# Patient Record
Sex: FEMALE | Race: BLACK OR AFRICAN AMERICAN | Employment: FULL TIME | URBAN - METROPOLITAN AREA
[De-identification: names, ages, dates, MRNs, and addresses within clinical notes are randomized per-mention and may not be internally consistent; named-entity substitution may affect disease eponyms.]

---

## 2020-03-16 ENCOUNTER — HOSPITAL ENCOUNTER (INPATIENT)
Age: 35
LOS: 1 days | Discharge: HOME | DRG: 263 | End: 2020-03-18
Attending: EMERGENCY MEDICINE | Admitting: SURGERY
Payer: COMMERCIAL

## 2020-03-16 ENCOUNTER — APPOINTMENT (OUTPATIENT)
Dept: GENERAL RADIOLOGY | Age: 35
DRG: 263 | End: 2020-03-16
Payer: COMMERCIAL

## 2020-03-16 LAB
ALBUMIN SERPL-MCNC: 3.7 G/DL (ref 3.4–5)
ALP BLD-CCNC: 47 U/L (ref 40–129)
ALT SERPL-CCNC: 13 U/L (ref 10–40)
ANION GAP SERPL CALCULATED.3IONS-SCNC: 13 MMOL/L (ref 3–16)
AST SERPL-CCNC: 17 U/L (ref 15–37)
BASOPHILS ABSOLUTE: 0.1 K/UL (ref 0–0.2)
BASOPHILS RELATIVE PERCENT: 0.9 %
BILIRUB SERPL-MCNC: <0.2 MG/DL (ref 0–1)
BILIRUBIN DIRECT: <0.2 MG/DL (ref 0–0.3)
BILIRUBIN, INDIRECT: NORMAL MG/DL (ref 0–1)
BUN BLDV-MCNC: 10 MG/DL (ref 7–20)
CALCIUM SERPL-MCNC: 9.5 MG/DL (ref 8.3–10.6)
CHLORIDE BLD-SCNC: 103 MMOL/L (ref 99–110)
CO2: 22 MMOL/L (ref 21–32)
CREAT SERPL-MCNC: 0.8 MG/DL (ref 0.6–1.1)
EOSINOPHILS ABSOLUTE: 0.1 K/UL (ref 0–0.6)
EOSINOPHILS RELATIVE PERCENT: 0.9 %
GFR AFRICAN AMERICAN: >60
GFR NON-AFRICAN AMERICAN: >60
GLUCOSE BLD-MCNC: 133 MG/DL (ref 70–99)
HCT VFR BLD CALC: 35.4 % (ref 36–48)
HEMOGLOBIN: 11.6 G/DL (ref 12–16)
LIPASE: 30 U/L (ref 13–60)
LYMPHOCYTES ABSOLUTE: 3.8 K/UL (ref 1–5.1)
LYMPHOCYTES RELATIVE PERCENT: 41.1 %
MAGNESIUM: 1.7 MG/DL (ref 1.8–2.4)
MCH RBC QN AUTO: 26.1 PG (ref 26–34)
MCHC RBC AUTO-ENTMCNC: 32.7 G/DL (ref 31–36)
MCV RBC AUTO: 79.9 FL (ref 80–100)
MONOCYTES ABSOLUTE: 0.8 K/UL (ref 0–1.3)
MONOCYTES RELATIVE PERCENT: 8.5 %
NEUTROPHILS ABSOLUTE: 4.5 K/UL (ref 1.7–7.7)
NEUTROPHILS RELATIVE PERCENT: 48.6 %
PDW BLD-RTO: 15.5 % (ref 12.4–15.4)
PHOSPHORUS: 2.3 MG/DL (ref 2.5–4.9)
PLATELET # BLD: 263 K/UL (ref 135–450)
PMV BLD AUTO: 8.7 FL (ref 5–10.5)
POTASSIUM SERPL-SCNC: 3.6 MMOL/L (ref 3.5–5.1)
RBC # BLD: 4.43 M/UL (ref 4–5.2)
SODIUM BLD-SCNC: 138 MMOL/L (ref 136–145)
TOTAL PROTEIN: 7.3 G/DL (ref 6.4–8.2)
TROPONIN: <0.01 NG/ML
WBC # BLD: 9.3 K/UL (ref 4–11)

## 2020-03-16 PROCEDURE — 80076 HEPATIC FUNCTION PANEL: CPT

## 2020-03-16 PROCEDURE — 71046 X-RAY EXAM CHEST 2 VIEWS: CPT

## 2020-03-16 PROCEDURE — 83735 ASSAY OF MAGNESIUM: CPT

## 2020-03-16 PROCEDURE — 99285 EMERGENCY DEPT VISIT HI MDM: CPT

## 2020-03-16 PROCEDURE — 80048 BASIC METABOLIC PNL TOTAL CA: CPT

## 2020-03-16 PROCEDURE — 93005 ELECTROCARDIOGRAM TRACING: CPT | Performed by: EMERGENCY MEDICINE

## 2020-03-16 PROCEDURE — 84100 ASSAY OF PHOSPHORUS: CPT

## 2020-03-16 PROCEDURE — 96374 THER/PROPH/DIAG INJ IV PUSH: CPT

## 2020-03-16 PROCEDURE — 83690 ASSAY OF LIPASE: CPT

## 2020-03-16 PROCEDURE — 84484 ASSAY OF TROPONIN QUANT: CPT

## 2020-03-16 PROCEDURE — 85025 COMPLETE CBC W/AUTO DIFF WBC: CPT

## 2020-03-16 PROCEDURE — 6360000002 HC RX W HCPCS: Performed by: EMERGENCY MEDICINE

## 2020-03-16 RX ORDER — LISINOPRIL 20 MG/1
20 TABLET ORAL DAILY
COMMUNITY
Start: 2020-03-16

## 2020-03-16 RX ORDER — HYDROCHLOROTHIAZIDE 25 MG/1
25 TABLET ORAL DAILY
COMMUNITY
Start: 2020-03-16

## 2020-03-16 RX ADMIN — HYDROMORPHONE HYDROCHLORIDE 1 MG: 1 INJECTION, SOLUTION INTRAMUSCULAR; INTRAVENOUS; SUBCUTANEOUS at 22:21

## 2020-03-16 ASSESSMENT — PAIN SCALES - GENERAL
PAINLEVEL_OUTOF10: 10
PAINLEVEL_OUTOF10: 5

## 2020-03-17 ENCOUNTER — ANESTHESIA (OUTPATIENT)
Dept: OPERATING ROOM | Age: 35
DRG: 263 | End: 2020-03-17
Payer: COMMERCIAL

## 2020-03-17 ENCOUNTER — APPOINTMENT (OUTPATIENT)
Dept: CT IMAGING | Age: 35
DRG: 263 | End: 2020-03-17
Payer: COMMERCIAL

## 2020-03-17 ENCOUNTER — ANESTHESIA EVENT (OUTPATIENT)
Dept: OPERATING ROOM | Age: 35
DRG: 263 | End: 2020-03-17
Payer: COMMERCIAL

## 2020-03-17 VITALS — TEMPERATURE: 97.3 F | OXYGEN SATURATION: 100 % | DIASTOLIC BLOOD PRESSURE: 104 MMHG | SYSTOLIC BLOOD PRESSURE: 177 MMHG

## 2020-03-17 PROBLEM — K81.0 ACUTE CHOLECYSTITIS: Status: ACTIVE | Noted: 2020-03-17

## 2020-03-17 PROBLEM — K80.12 CHOLELITHIASIS WITH ACUTE ON CHRONIC CHOLECYSTITIS: Status: ACTIVE | Noted: 2020-03-17

## 2020-03-17 LAB
ALBUMIN SERPL-MCNC: 3.5 G/DL (ref 3.4–5)
ALP BLD-CCNC: 49 U/L (ref 40–129)
ALT SERPL-CCNC: 12 U/L (ref 10–40)
ANION GAP SERPL CALCULATED.3IONS-SCNC: 11 MMOL/L (ref 3–16)
AST SERPL-CCNC: 16 U/L (ref 15–37)
BASOPHILS ABSOLUTE: 0 K/UL (ref 0–0.2)
BASOPHILS RELATIVE PERCENT: 0.2 %
BILIRUB SERPL-MCNC: <0.2 MG/DL (ref 0–1)
BILIRUBIN DIRECT: <0.2 MG/DL (ref 0–0.3)
BILIRUBIN URINE: NEGATIVE
BILIRUBIN, INDIRECT: NORMAL MG/DL (ref 0–1)
BLOOD, URINE: NEGATIVE
BUN BLDV-MCNC: 9 MG/DL (ref 7–20)
CALCIUM SERPL-MCNC: 8.9 MG/DL (ref 8.3–10.6)
CHLORIDE BLD-SCNC: 103 MMOL/L (ref 99–110)
CLARITY: CLEAR
CO2: 22 MMOL/L (ref 21–32)
COLOR: YELLOW
CREAT SERPL-MCNC: 0.6 MG/DL (ref 0.6–1.1)
EKG ATRIAL RATE: 86 BPM
EKG DIAGNOSIS: NORMAL
EKG P AXIS: 56 DEGREES
EKG P-R INTERVAL: 176 MS
EKG Q-T INTERVAL: 392 MS
EKG QRS DURATION: 84 MS
EKG QTC CALCULATION (BAZETT): 469 MS
EKG R AXIS: 46 DEGREES
EKG T AXIS: 59 DEGREES
EKG VENTRICULAR RATE: 86 BPM
EOSINOPHILS ABSOLUTE: 0 K/UL (ref 0–0.6)
EOSINOPHILS RELATIVE PERCENT: 0.1 %
GFR AFRICAN AMERICAN: >60
GFR NON-AFRICAN AMERICAN: >60
GLUCOSE BLD-MCNC: 136 MG/DL (ref 70–99)
GLUCOSE URINE: NEGATIVE MG/DL
HCG QUALITATIVE: NEGATIVE
HCT VFR BLD CALC: 35.3 % (ref 36–48)
HEMOGLOBIN: 11.6 G/DL (ref 12–16)
KETONES, URINE: NEGATIVE MG/DL
LEUKOCYTE ESTERASE, URINE: NEGATIVE
LYMPHOCYTES ABSOLUTE: 1.4 K/UL (ref 1–5.1)
LYMPHOCYTES RELATIVE PERCENT: 15.3 %
MAGNESIUM: 1.8 MG/DL (ref 1.8–2.4)
MCH RBC QN AUTO: 26.5 PG (ref 26–34)
MCHC RBC AUTO-ENTMCNC: 32.9 G/DL (ref 31–36)
MCV RBC AUTO: 80.5 FL (ref 80–100)
MICROSCOPIC EXAMINATION: NORMAL
MONOCYTES ABSOLUTE: 0.6 K/UL (ref 0–1.3)
MONOCYTES RELATIVE PERCENT: 6.4 %
NEUTROPHILS ABSOLUTE: 7.4 K/UL (ref 1.7–7.7)
NEUTROPHILS RELATIVE PERCENT: 78 %
NITRITE, URINE: NEGATIVE
PDW BLD-RTO: 16.1 % (ref 12.4–15.4)
PH UA: 6 (ref 5–8)
PHOSPHORUS: 2.9 MG/DL (ref 2.5–4.9)
PLATELET # BLD: 246 K/UL (ref 135–450)
PMV BLD AUTO: 8.8 FL (ref 5–10.5)
POTASSIUM SERPL-SCNC: 3.8 MMOL/L (ref 3.5–5.1)
PROTEIN UA: NEGATIVE MG/DL
RBC # BLD: 4.38 M/UL (ref 4–5.2)
SODIUM BLD-SCNC: 136 MMOL/L (ref 136–145)
SPECIFIC GRAVITY UA: 1.02 (ref 1–1.03)
TOTAL PROTEIN: 6.8 G/DL (ref 6.4–8.2)
URINE REFLEX TO CULTURE: NORMAL
URINE TYPE: NORMAL
UROBILINOGEN, URINE: 0.2 E.U./DL
WBC # BLD: 9.5 K/UL (ref 4–11)

## 2020-03-17 PROCEDURE — 6360000002 HC RX W HCPCS: Performed by: STUDENT IN AN ORGANIZED HEALTH CARE EDUCATION/TRAINING PROGRAM

## 2020-03-17 PROCEDURE — 6360000002 HC RX W HCPCS: Performed by: ANESTHESIOLOGY

## 2020-03-17 PROCEDURE — 96376 TX/PRO/DX INJ SAME DRUG ADON: CPT

## 2020-03-17 PROCEDURE — 0WQF4ZZ REPAIR ABDOMINAL WALL, PERCUTANEOUS ENDOSCOPIC APPROACH: ICD-10-PCS | Performed by: SURGERY

## 2020-03-17 PROCEDURE — 49653 PR LAP, VENTRAL HERNIA REPAIR,INCARCERATED: CPT | Performed by: SURGERY

## 2020-03-17 PROCEDURE — 47562 LAPAROSCOPIC CHOLECYSTECTOMY: CPT | Performed by: SURGERY

## 2020-03-17 PROCEDURE — 6360000002 HC RX W HCPCS: Performed by: NURSE ANESTHETIST, CERTIFIED REGISTERED

## 2020-03-17 PROCEDURE — 99220 PR INITIAL OBSERVATION CARE/DAY 70 MINUTES: CPT | Performed by: SURGERY

## 2020-03-17 PROCEDURE — 6360000004 HC RX CONTRAST MEDICATION: Performed by: EMERGENCY MEDICINE

## 2020-03-17 PROCEDURE — 2500000003 HC RX 250 WO HCPCS: Performed by: STUDENT IN AN ORGANIZED HEALTH CARE EDUCATION/TRAINING PROGRAM

## 2020-03-17 PROCEDURE — 6370000000 HC RX 637 (ALT 250 FOR IP): Performed by: EMERGENCY MEDICINE

## 2020-03-17 PROCEDURE — 88304 TISSUE EXAM BY PATHOLOGIST: CPT

## 2020-03-17 PROCEDURE — 2580000003 HC RX 258: Performed by: STUDENT IN AN ORGANIZED HEALTH CARE EDUCATION/TRAINING PROGRAM

## 2020-03-17 PROCEDURE — G0378 HOSPITAL OBSERVATION PER HR: HCPCS

## 2020-03-17 PROCEDURE — 83735 ASSAY OF MAGNESIUM: CPT

## 2020-03-17 PROCEDURE — 7100000000 HC PACU RECOVERY - FIRST 15 MIN: Performed by: SURGERY

## 2020-03-17 PROCEDURE — 74177 CT ABD & PELVIS W/CONTRAST: CPT

## 2020-03-17 PROCEDURE — 3700000001 HC ADD 15 MINUTES (ANESTHESIA): Performed by: SURGERY

## 2020-03-17 PROCEDURE — 3700000000 HC ANESTHESIA ATTENDED CARE: Performed by: SURGERY

## 2020-03-17 PROCEDURE — 2500000003 HC RX 250 WO HCPCS: Performed by: NURSE ANESTHETIST, CERTIFIED REGISTERED

## 2020-03-17 PROCEDURE — 80069 RENAL FUNCTION PANEL: CPT

## 2020-03-17 PROCEDURE — 2500000003 HC RX 250 WO HCPCS: Performed by: ANESTHESIOLOGY

## 2020-03-17 PROCEDURE — 2709999900 HC NON-CHARGEABLE SUPPLY: Performed by: SURGERY

## 2020-03-17 PROCEDURE — 85025 COMPLETE CBC W/AUTO DIFF WBC: CPT

## 2020-03-17 PROCEDURE — 36415 COLL VENOUS BLD VENIPUNCTURE: CPT

## 2020-03-17 PROCEDURE — 3600000014 HC SURGERY LEVEL 4 ADDTL 15MIN: Performed by: SURGERY

## 2020-03-17 PROCEDURE — 6370000000 HC RX 637 (ALT 250 FOR IP): Performed by: STUDENT IN AN ORGANIZED HEALTH CARE EDUCATION/TRAINING PROGRAM

## 2020-03-17 PROCEDURE — 2580000003 HC RX 258: Performed by: SURGERY

## 2020-03-17 PROCEDURE — 84703 CHORIONIC GONADOTROPIN ASSAY: CPT

## 2020-03-17 PROCEDURE — 0FT44ZZ RESECTION OF GALLBLADDER, PERCUTANEOUS ENDOSCOPIC APPROACH: ICD-10-PCS | Performed by: SURGERY

## 2020-03-17 PROCEDURE — 2720000010 HC SURG SUPPLY STERILE: Performed by: SURGERY

## 2020-03-17 PROCEDURE — 81003 URINALYSIS AUTO W/O SCOPE: CPT

## 2020-03-17 PROCEDURE — 2500000003 HC RX 250 WO HCPCS: Performed by: SURGERY

## 2020-03-17 PROCEDURE — 1200000000 HC SEMI PRIVATE

## 2020-03-17 PROCEDURE — 80076 HEPATIC FUNCTION PANEL: CPT

## 2020-03-17 PROCEDURE — 3600000004 HC SURGERY LEVEL 4 BASE: Performed by: SURGERY

## 2020-03-17 PROCEDURE — 7100000001 HC PACU RECOVERY - ADDTL 15 MIN: Performed by: SURGERY

## 2020-03-17 PROCEDURE — 6360000002 HC RX W HCPCS: Performed by: EMERGENCY MEDICINE

## 2020-03-17 RX ORDER — ACETAMINOPHEN 500 MG
1000 TABLET ORAL EVERY 6 HOURS
Status: DISCONTINUED | OUTPATIENT
Start: 2020-03-17 | End: 2020-03-18 | Stop reason: HOSPADM

## 2020-03-17 RX ORDER — NEOSTIGMINE METHYLSULFATE 5 MG/5 ML
SYRINGE (ML) INTRAVENOUS PRN
Status: DISCONTINUED | OUTPATIENT
Start: 2020-03-17 | End: 2020-03-17 | Stop reason: SDUPTHER

## 2020-03-17 RX ORDER — HYDRALAZINE HYDROCHLORIDE 20 MG/ML
10 INJECTION INTRAMUSCULAR; INTRAVENOUS EVERY 6 HOURS PRN
Status: DISCONTINUED | OUTPATIENT
Start: 2020-03-17 | End: 2020-03-18 | Stop reason: HOSPADM

## 2020-03-17 RX ORDER — DEXAMETHASONE SODIUM PHOSPHATE 4 MG/ML
INJECTION, SOLUTION INTRA-ARTICULAR; INTRALESIONAL; INTRAMUSCULAR; INTRAVENOUS; SOFT TISSUE PRN
Status: DISCONTINUED | OUTPATIENT
Start: 2020-03-17 | End: 2020-03-17 | Stop reason: SDUPTHER

## 2020-03-17 RX ORDER — PHENYLEPHRINE HYDROCHLORIDE 10 MG/ML
INJECTION INTRAVENOUS PRN
Status: DISCONTINUED | OUTPATIENT
Start: 2020-03-17 | End: 2020-03-17 | Stop reason: SDUPTHER

## 2020-03-17 RX ORDER — MAGNESIUM SULFATE IN WATER 40 MG/ML
2 INJECTION, SOLUTION INTRAVENOUS ONCE
Status: COMPLETED | OUTPATIENT
Start: 2020-03-17 | End: 2020-03-17

## 2020-03-17 RX ORDER — SODIUM CHLORIDE, SODIUM LACTATE, POTASSIUM CHLORIDE, CALCIUM CHLORIDE 600; 310; 30; 20 MG/100ML; MG/100ML; MG/100ML; MG/100ML
INJECTION, SOLUTION INTRAVENOUS CONTINUOUS
Status: DISCONTINUED | OUTPATIENT
Start: 2020-03-17 | End: 2020-03-18

## 2020-03-17 RX ORDER — HYDROMORPHONE HCL 110MG/55ML
PATIENT CONTROLLED ANALGESIA SYRINGE INTRAVENOUS PRN
Status: DISCONTINUED | OUTPATIENT
Start: 2020-03-17 | End: 2020-03-17 | Stop reason: SDUPTHER

## 2020-03-17 RX ORDER — FENTANYL CITRATE 50 UG/ML
INJECTION, SOLUTION INTRAMUSCULAR; INTRAVENOUS PRN
Status: DISCONTINUED | OUTPATIENT
Start: 2020-03-17 | End: 2020-03-17 | Stop reason: SDUPTHER

## 2020-03-17 RX ORDER — MAGNESIUM HYDROXIDE 1200 MG/15ML
LIQUID ORAL CONTINUOUS PRN
Status: COMPLETED | OUTPATIENT
Start: 2020-03-17 | End: 2020-03-17

## 2020-03-17 RX ORDER — OXYCODONE HYDROCHLORIDE 5 MG/1
10 TABLET ORAL EVERY 4 HOURS PRN
Status: DISCONTINUED | OUTPATIENT
Start: 2020-03-17 | End: 2020-03-18 | Stop reason: HOSPADM

## 2020-03-17 RX ORDER — LABETALOL 20 MG/4 ML (5 MG/ML) INTRAVENOUS SYRINGE
5 EVERY 10 MIN PRN
Status: DISCONTINUED | OUTPATIENT
Start: 2020-03-17 | End: 2020-03-17 | Stop reason: HOSPADM

## 2020-03-17 RX ORDER — OXYCODONE HYDROCHLORIDE 5 MG/1
5 TABLET ORAL EVERY 4 HOURS PRN
Status: DISCONTINUED | OUTPATIENT
Start: 2020-03-17 | End: 2020-03-18 | Stop reason: HOSPADM

## 2020-03-17 RX ORDER — SODIUM CHLORIDE 0.9 % (FLUSH) 0.9 %
10 SYRINGE (ML) INJECTION EVERY 12 HOURS SCHEDULED
Status: DISCONTINUED | OUTPATIENT
Start: 2020-03-17 | End: 2020-03-18 | Stop reason: HOSPADM

## 2020-03-17 RX ORDER — HYDRALAZINE HYDROCHLORIDE 20 MG/ML
5 INJECTION INTRAMUSCULAR; INTRAVENOUS EVERY 10 MIN PRN
Status: DISCONTINUED | OUTPATIENT
Start: 2020-03-17 | End: 2020-03-17 | Stop reason: HOSPADM

## 2020-03-17 RX ORDER — OXYCODONE HYDROCHLORIDE AND ACETAMINOPHEN 5; 325 MG/1; MG/1
1 TABLET ORAL
Status: DISCONTINUED | OUTPATIENT
Start: 2020-03-17 | End: 2020-03-17 | Stop reason: HOSPADM

## 2020-03-17 RX ORDER — SODIUM CHLORIDE 0.9 % (FLUSH) 0.9 %
10 SYRINGE (ML) INJECTION PRN
Status: DISCONTINUED | OUTPATIENT
Start: 2020-03-17 | End: 2020-03-18 | Stop reason: HOSPADM

## 2020-03-17 RX ORDER — POTASSIUM CHLORIDE 7.45 MG/ML
10 INJECTION INTRAVENOUS
Status: COMPLETED | OUTPATIENT
Start: 2020-03-17 | End: 2020-03-17

## 2020-03-17 RX ORDER — ONDANSETRON 2 MG/ML
4 INJECTION INTRAMUSCULAR; INTRAVENOUS EVERY 6 HOURS PRN
Status: DISCONTINUED | OUTPATIENT
Start: 2020-03-17 | End: 2020-03-18 | Stop reason: HOSPADM

## 2020-03-17 RX ORDER — MORPHINE SULFATE 2 MG/ML
2 INJECTION, SOLUTION INTRAMUSCULAR; INTRAVENOUS
Status: DISCONTINUED | OUTPATIENT
Start: 2020-03-17 | End: 2020-03-18

## 2020-03-17 RX ORDER — SODIUM CHLORIDE, SODIUM LACTATE, POTASSIUM CHLORIDE, CALCIUM CHLORIDE 600; 310; 30; 20 MG/100ML; MG/100ML; MG/100ML; MG/100ML
INJECTION, SOLUTION INTRAVENOUS CONTINUOUS PRN
Status: COMPLETED | OUTPATIENT
Start: 2020-03-17 | End: 2020-03-17

## 2020-03-17 RX ORDER — ENALAPRILAT 2.5 MG/2ML
1.25 INJECTION INTRAVENOUS ONCE
Status: COMPLETED | OUTPATIENT
Start: 2020-03-17 | End: 2020-03-17

## 2020-03-17 RX ORDER — PROMETHAZINE HYDROCHLORIDE 25 MG/ML
6.25 INJECTION, SOLUTION INTRAMUSCULAR; INTRAVENOUS
Status: DISCONTINUED | OUTPATIENT
Start: 2020-03-17 | End: 2020-03-17 | Stop reason: HOSPADM

## 2020-03-17 RX ORDER — MIDAZOLAM HYDROCHLORIDE 1 MG/ML
INJECTION INTRAMUSCULAR; INTRAVENOUS PRN
Status: DISCONTINUED | OUTPATIENT
Start: 2020-03-17 | End: 2020-03-17 | Stop reason: SDUPTHER

## 2020-03-17 RX ORDER — FENTANYL CITRATE 50 UG/ML
50 INJECTION, SOLUTION INTRAMUSCULAR; INTRAVENOUS EVERY 5 MIN PRN
Status: DISCONTINUED | OUTPATIENT
Start: 2020-03-17 | End: 2020-03-17 | Stop reason: HOSPADM

## 2020-03-17 RX ORDER — FENTANYL CITRATE 50 UG/ML
25 INJECTION, SOLUTION INTRAMUSCULAR; INTRAVENOUS EVERY 5 MIN PRN
Status: DISCONTINUED | OUTPATIENT
Start: 2020-03-17 | End: 2020-03-17 | Stop reason: HOSPADM

## 2020-03-17 RX ORDER — PROPOFOL 10 MG/ML
INJECTION, EMULSION INTRAVENOUS PRN
Status: DISCONTINUED | OUTPATIENT
Start: 2020-03-17 | End: 2020-03-17 | Stop reason: SDUPTHER

## 2020-03-17 RX ORDER — BUPIVACAINE HYDROCHLORIDE 5 MG/ML
INJECTION, SOLUTION EPIDURAL; INTRACAUDAL PRN
Status: DISCONTINUED | OUTPATIENT
Start: 2020-03-17 | End: 2020-03-17 | Stop reason: ALTCHOICE

## 2020-03-17 RX ORDER — MORPHINE SULFATE 4 MG/ML
4 INJECTION, SOLUTION INTRAMUSCULAR; INTRAVENOUS
Status: DISCONTINUED | OUTPATIENT
Start: 2020-03-17 | End: 2020-03-18

## 2020-03-17 RX ORDER — ROCURONIUM BROMIDE 10 MG/ML
INJECTION, SOLUTION INTRAVENOUS PRN
Status: DISCONTINUED | OUTPATIENT
Start: 2020-03-17 | End: 2020-03-17 | Stop reason: SDUPTHER

## 2020-03-17 RX ORDER — GLYCOPYRROLATE 1 MG/5 ML
SYRINGE (ML) INTRAVENOUS PRN
Status: DISCONTINUED | OUTPATIENT
Start: 2020-03-17 | End: 2020-03-17 | Stop reason: SDUPTHER

## 2020-03-17 RX ORDER — ONDANSETRON 2 MG/ML
INJECTION INTRAMUSCULAR; INTRAVENOUS PRN
Status: DISCONTINUED | OUTPATIENT
Start: 2020-03-17 | End: 2020-03-17 | Stop reason: SDUPTHER

## 2020-03-17 RX ORDER — ONDANSETRON 2 MG/ML
4 INJECTION INTRAMUSCULAR; INTRAVENOUS
Status: DISCONTINUED | OUTPATIENT
Start: 2020-03-17 | End: 2020-03-17 | Stop reason: HOSPADM

## 2020-03-17 RX ORDER — ENALAPRILAT 2.5 MG/2ML
1.25 INJECTION INTRAVENOUS EVERY 6 HOURS PRN
Status: DISCONTINUED | OUTPATIENT
Start: 2020-03-17 | End: 2020-03-18 | Stop reason: HOSPADM

## 2020-03-17 RX ORDER — LIDOCAINE HYDROCHLORIDE 20 MG/ML
INJECTION, SOLUTION INTRAVENOUS PRN
Status: DISCONTINUED | OUTPATIENT
Start: 2020-03-17 | End: 2020-03-17 | Stop reason: SDUPTHER

## 2020-03-17 RX ADMIN — MORPHINE SULFATE 4 MG: 4 INJECTION INTRAVENOUS at 03:46

## 2020-03-17 RX ADMIN — HYDRALAZINE HYDROCHLORIDE 5 MG: 20 INJECTION INTRAMUSCULAR; INTRAVENOUS at 16:57

## 2020-03-17 RX ADMIN — MIDAZOLAM HYDROCHLORIDE 2 MG: 2 INJECTION, SOLUTION INTRAMUSCULAR; INTRAVENOUS at 14:09

## 2020-03-17 RX ADMIN — PHENYLEPHRINE HYDROCHLORIDE 100 MCG: 10 INJECTION INTRAVENOUS at 15:04

## 2020-03-17 RX ADMIN — ENALAPRILAT 1.25 MG: 2.5 INJECTION INTRAVENOUS at 20:14

## 2020-03-17 RX ADMIN — PHENYLEPHRINE HYDROCHLORIDE 100 MCG: 10 INJECTION INTRAVENOUS at 15:01

## 2020-03-17 RX ADMIN — MORPHINE SULFATE 4 MG: 4 INJECTION INTRAVENOUS at 22:22

## 2020-03-17 RX ADMIN — HYDROMORPHONE HYDROCHLORIDE 1 MG: 1 INJECTION, SOLUTION INTRAMUSCULAR; INTRAVENOUS; SUBCUTANEOUS at 02:10

## 2020-03-17 RX ADMIN — Medication 0.4 MG: at 16:10

## 2020-03-17 RX ADMIN — LIDOCAINE HYDROCHLORIDE: 20 SOLUTION ORAL; TOPICAL at 01:37

## 2020-03-17 RX ADMIN — CEFAZOLIN SODIUM 2 G: 10 INJECTION, POWDER, FOR SOLUTION INTRAVENOUS at 14:20

## 2020-03-17 RX ADMIN — ROCURONIUM BROMIDE 50 MG: 10 INJECTION, SOLUTION INTRAVENOUS at 14:18

## 2020-03-17 RX ADMIN — ENALAPRILAT 1.25 MG: 2.5 INJECTION INTRAVENOUS at 03:46

## 2020-03-17 RX ADMIN — MORPHINE SULFATE 2 MG: 2 INJECTION, SOLUTION INTRAMUSCULAR; INTRAVENOUS at 08:28

## 2020-03-17 RX ADMIN — LIDOCAINE HYDROCHLORIDE 50 MG: 20 INJECTION, SOLUTION INTRAVENOUS at 14:18

## 2020-03-17 RX ADMIN — ONDANSETRON 4 MG: 2 INJECTION INTRAMUSCULAR; INTRAVENOUS at 16:03

## 2020-03-17 RX ADMIN — HYDROMORPHONE HYDROCHLORIDE 2 MG: 2 INJECTION, SOLUTION INTRAMUSCULAR; INTRAVENOUS; SUBCUTANEOUS at 14:42

## 2020-03-17 RX ADMIN — LABETALOL 20 MG/4 ML (5 MG/ML) INTRAVENOUS SYRINGE 5 MG: at 17:21

## 2020-03-17 RX ADMIN — IOPAMIDOL 80 ML: 755 INJECTION, SOLUTION INTRAVENOUS at 01:20

## 2020-03-17 RX ADMIN — HYDRALAZINE HYDROCHLORIDE 10 MG: 20 INJECTION INTRAMUSCULAR; INTRAVENOUS at 23:29

## 2020-03-17 RX ADMIN — FENTANYL CITRATE 100 MCG: 50 INJECTION INTRAMUSCULAR; INTRAVENOUS at 14:27

## 2020-03-17 RX ADMIN — Medication 10 ML: at 08:28

## 2020-03-17 RX ADMIN — HYDROMORPHONE HYDROCHLORIDE 0.25 MG: 1 INJECTION, SOLUTION INTRAMUSCULAR; INTRAVENOUS; SUBCUTANEOUS at 17:13

## 2020-03-17 RX ADMIN — ENOXAPARIN SODIUM 30 MG: 30 INJECTION SUBCUTANEOUS at 08:28

## 2020-03-17 RX ADMIN — MORPHINE SULFATE 2 MG: 2 INJECTION, SOLUTION INTRAMUSCULAR; INTRAVENOUS at 11:48

## 2020-03-17 RX ADMIN — OXYCODONE 10 MG: 5 TABLET ORAL at 20:22

## 2020-03-17 RX ADMIN — MORPHINE SULFATE 4 MG: 4 INJECTION INTRAVENOUS at 19:18

## 2020-03-17 RX ADMIN — Medication 10 ML: at 20:15

## 2020-03-17 RX ADMIN — HYDRALAZINE HYDROCHLORIDE 10 MG: 20 INJECTION INTRAMUSCULAR; INTRAVENOUS at 02:37

## 2020-03-17 RX ADMIN — ENALAPRILAT 1.25 MG: 2.5 INJECTION INTRAVENOUS at 03:17

## 2020-03-17 RX ADMIN — ENOXAPARIN SODIUM 30 MG: 30 INJECTION SUBCUTANEOUS at 20:14

## 2020-03-17 RX ADMIN — HYDROMORPHONE HYDROCHLORIDE 0.25 MG: 1 INJECTION, SOLUTION INTRAMUSCULAR; INTRAVENOUS; SUBCUTANEOUS at 17:01

## 2020-03-17 RX ADMIN — POTASSIUM CHLORIDE 10 MEQ: 7.46 INJECTION, SOLUTION INTRAVENOUS at 10:19

## 2020-03-17 RX ADMIN — HYDROMORPHONE HYDROCHLORIDE 0.25 MG: 1 INJECTION, SOLUTION INTRAMUSCULAR; INTRAVENOUS; SUBCUTANEOUS at 17:35

## 2020-03-17 RX ADMIN — HYDRALAZINE HYDROCHLORIDE 5 MG: 20 INJECTION INTRAMUSCULAR; INTRAVENOUS at 16:47

## 2020-03-17 RX ADMIN — ONDANSETRON 4 MG: 2 INJECTION INTRAMUSCULAR; INTRAVENOUS at 03:46

## 2020-03-17 RX ADMIN — Medication 3 MG: at 16:10

## 2020-03-17 RX ADMIN — ACETAMINOPHEN 1000 MG: 500 TABLET, COATED ORAL at 22:22

## 2020-03-17 RX ADMIN — LABETALOL 20 MG/4 ML (5 MG/ML) INTRAVENOUS SYRINGE 5 MG: at 17:33

## 2020-03-17 RX ADMIN — HYDROMORPHONE HYDROCHLORIDE 0.25 MG: 1 INJECTION, SOLUTION INTRAMUSCULAR; INTRAVENOUS; SUBCUTANEOUS at 16:41

## 2020-03-17 RX ADMIN — FENTANYL CITRATE 100 MCG: 50 INJECTION INTRAMUSCULAR; INTRAVENOUS at 14:18

## 2020-03-17 RX ADMIN — PROPOFOL 150 MG: 10 INJECTION, EMULSION INTRAVENOUS at 14:18

## 2020-03-17 RX ADMIN — DEXAMETHASONE SODIUM PHOSPHATE 8 MG: 4 INJECTION, SOLUTION INTRAMUSCULAR; INTRAVENOUS at 14:19

## 2020-03-17 RX ADMIN — SODIUM CHLORIDE, POTASSIUM CHLORIDE, SODIUM LACTATE AND CALCIUM CHLORIDE: 600; 310; 30; 20 INJECTION, SOLUTION INTRAVENOUS at 02:37

## 2020-03-17 RX ADMIN — POTASSIUM CHLORIDE 10 MEQ: 7.46 INJECTION, SOLUTION INTRAVENOUS at 11:43

## 2020-03-17 RX ADMIN — MAGNESIUM SULFATE HEPTAHYDRATE 2 G: 40 INJECTION, SOLUTION INTRAVENOUS at 09:59

## 2020-03-17 ASSESSMENT — PULMONARY FUNCTION TESTS
PIF_VALUE: 24
PIF_VALUE: 24
PIF_VALUE: 22
PIF_VALUE: 0
PIF_VALUE: 30
PIF_VALUE: 0
PIF_VALUE: 29
PIF_VALUE: 0
PIF_VALUE: 30
PIF_VALUE: 1
PIF_VALUE: 30
PIF_VALUE: 29
PIF_VALUE: 29
PIF_VALUE: 30
PIF_VALUE: 1
PIF_VALUE: 30
PIF_VALUE: 0
PIF_VALUE: 22
PIF_VALUE: 1
PIF_VALUE: 31
PIF_VALUE: 33
PIF_VALUE: 26
PIF_VALUE: 30
PIF_VALUE: 24
PIF_VALUE: 25
PIF_VALUE: 29
PIF_VALUE: 30
PIF_VALUE: 30
PIF_VALUE: 31
PIF_VALUE: 30
PIF_VALUE: 25
PIF_VALUE: 33
PIF_VALUE: 30
PIF_VALUE: 29
PIF_VALUE: 31
PIF_VALUE: 30
PIF_VALUE: 30
PIF_VALUE: 33
PIF_VALUE: 25
PIF_VALUE: 1
PIF_VALUE: 0
PIF_VALUE: 0
PIF_VALUE: 30
PIF_VALUE: 24
PIF_VALUE: 30
PIF_VALUE: 24
PIF_VALUE: 0
PIF_VALUE: 35
PIF_VALUE: 31
PIF_VALUE: 33
PIF_VALUE: 31
PIF_VALUE: 30
PIF_VALUE: 31
PIF_VALUE: 33
PIF_VALUE: 24
PIF_VALUE: 31
PIF_VALUE: 29
PIF_VALUE: 25
PIF_VALUE: 31
PIF_VALUE: 1
PIF_VALUE: 1
PIF_VALUE: 3
PIF_VALUE: 31
PIF_VALUE: 23
PIF_VALUE: 24
PIF_VALUE: 30
PIF_VALUE: 1
PIF_VALUE: 30
PIF_VALUE: 0
PIF_VALUE: 32
PIF_VALUE: 25
PIF_VALUE: 33
PIF_VALUE: 25
PIF_VALUE: 22
PIF_VALUE: 31
PIF_VALUE: 1
PIF_VALUE: 22
PIF_VALUE: 23
PIF_VALUE: 1
PIF_VALUE: 31
PIF_VALUE: 29
PIF_VALUE: 31
PIF_VALUE: 33
PIF_VALUE: 22
PIF_VALUE: 22
PIF_VALUE: 0
PIF_VALUE: 31
PIF_VALUE: 1
PIF_VALUE: 34
PIF_VALUE: 29
PIF_VALUE: 25
PIF_VALUE: 33
PIF_VALUE: 24
PIF_VALUE: 24
PIF_VALUE: 30
PIF_VALUE: 30
PIF_VALUE: 1
PIF_VALUE: 1
PIF_VALUE: 0
PIF_VALUE: 25
PIF_VALUE: 30
PIF_VALUE: 1
PIF_VALUE: 30
PIF_VALUE: 27
PIF_VALUE: 29
PIF_VALUE: 30
PIF_VALUE: 31
PIF_VALUE: 1
PIF_VALUE: 31
PIF_VALUE: 30
PIF_VALUE: 34
PIF_VALUE: 30
PIF_VALUE: 22
PIF_VALUE: 20
PIF_VALUE: 24
PIF_VALUE: 22
PIF_VALUE: 25
PIF_VALUE: 31
PIF_VALUE: 30
PIF_VALUE: 32
PIF_VALUE: 24
PIF_VALUE: 30
PIF_VALUE: 33
PIF_VALUE: 1
PIF_VALUE: 1
PIF_VALUE: 25
PIF_VALUE: 22
PIF_VALUE: 30
PIF_VALUE: 0
PIF_VALUE: 24
PIF_VALUE: 32
PIF_VALUE: 24
PIF_VALUE: 22
PIF_VALUE: 22
PIF_VALUE: 1
PIF_VALUE: 29
PIF_VALUE: 1
PIF_VALUE: 30
PIF_VALUE: 0
PIF_VALUE: 0

## 2020-03-17 ASSESSMENT — PAIN DESCRIPTION - PAIN TYPE
TYPE: SURGICAL PAIN
TYPE: ACUTE PAIN
TYPE: ACUTE PAIN
TYPE: SURGICAL PAIN;ACUTE PAIN
TYPE: SURGICAL PAIN
TYPE: ACUTE PAIN

## 2020-03-17 ASSESSMENT — PAIN DESCRIPTION - LOCATION
LOCATION: ABDOMEN

## 2020-03-17 ASSESSMENT — PAIN DESCRIPTION - ONSET
ONSET: ON-GOING

## 2020-03-17 ASSESSMENT — PAIN - FUNCTIONAL ASSESSMENT
PAIN_FUNCTIONAL_ASSESSMENT: PREVENTS OR INTERFERES SOME ACTIVE ACTIVITIES AND ADLS
PAIN_FUNCTIONAL_ASSESSMENT: PREVENTS OR INTERFERES SOME ACTIVE ACTIVITIES AND ADLS
PAIN_FUNCTIONAL_ASSESSMENT: ACTIVITIES ARE NOT PREVENTED

## 2020-03-17 ASSESSMENT — PAIN SCALES - GENERAL
PAINLEVEL_OUTOF10: 8
PAINLEVEL_OUTOF10: 6
PAINLEVEL_OUTOF10: 8
PAINLEVEL_OUTOF10: 7
PAINLEVEL_OUTOF10: 5
PAINLEVEL_OUTOF10: 6
PAINLEVEL_OUTOF10: 3
PAINLEVEL_OUTOF10: 6
PAINLEVEL_OUTOF10: 6
PAINLEVEL_OUTOF10: 10
PAINLEVEL_OUTOF10: 8
PAINLEVEL_OUTOF10: 8

## 2020-03-17 ASSESSMENT — PAIN DESCRIPTION - DESCRIPTORS
DESCRIPTORS: SHARP;STABBING
DESCRIPTORS: ACHING
DESCRIPTORS: ACHING
DESCRIPTORS: DISCOMFORT;SORE;SHARP
DESCRIPTORS: STABBING;THROBBING
DESCRIPTORS: STABBING;THROBBING

## 2020-03-17 ASSESSMENT — PAIN DESCRIPTION - PROGRESSION
CLINICAL_PROGRESSION: GRADUALLY WORSENING
CLINICAL_PROGRESSION: NOT CHANGED

## 2020-03-17 ASSESSMENT — PAIN DESCRIPTION - ORIENTATION
ORIENTATION: RIGHT;UPPER;INNER
ORIENTATION: MID
ORIENTATION: MID;UPPER
ORIENTATION: MID
ORIENTATION: RIGHT;UPPER;INNER
ORIENTATION: MID;UPPER;RIGHT

## 2020-03-17 ASSESSMENT — PAIN DESCRIPTION - FREQUENCY
FREQUENCY: CONTINUOUS
FREQUENCY: INTERMITTENT
FREQUENCY: INTERMITTENT
FREQUENCY: CONTINUOUS

## 2020-03-17 NOTE — PROGRESS NOTES
Awakened to see how pain is. She reports sore. I explained it would be sore, we can't get rid of all the pain. But we are aiming for tolerable. Appears to return to sleep.

## 2020-03-17 NOTE — DISCHARGE SUMMARY
Physician Discharge Summary     Patient ID:  Alonso Schaefer  [id number]  31 y.o.  1985    Admit date: 3/16/2020    Discharge date and time: 3/18/2020    Admitting Physician: Damion Graff DO     Discharge Physician: Damion Graff DO     Admission Diagnoses: Acute cholecystitis [K81.0]  Acute cholecystitis [K81.0]    Discharge Diagnoses: Acute cholecystitis [K81.0]  Acute cholecystitis [K81.0]    PMH:  has a past medical history of Hypertension. PSH:  has no past surgical history on file. Allergies: Patient has no known allergies. Admission Condition: fair    Discharged Condition: Stable    Indication for Admission: Acute cholecystitis [K81.0]  Acute cholecystitis [K81.0]    Hospital Course: This is a 28 y.o. female who presents with severe epigastric pain. Patient is afebrile, hypertensive. Labs WNL. CT scan obtained showing a large gallstone in the gallbladder, no wall thickening or pericholecystic fluid. Given persistent pain and evidence of cholelithiasis, will plan to take the patient to the OR later today for laparoscopic cholecystectomy. Patient underwent:   Post op:  POD 1 - patient doing well. Pain controlled. Ambulating hallways. Tolerating regular diet. Stable for discharge home. Patient discharged home in stable condition    Discharge Physical Exam:  General appearance: alert, appears stated age and cooperative  Lungs: clear to auscultation bilaterally  Heart: regular rate and rhythm, S1, S2   Abdomen: soft, appropriately-tender; bowel sounds present  Trocar sites well approx    Consults:   N/A    Significant Diagnostic Studies:   CT     Treatments/Procedures: surgery: Laparoscopic cholecystectomy    Disposition: home    Patient Instructions:   See discharge instruction form.     Signed:  Crispin Boast, MD  3/17/2020  11:44 AM  [phone]

## 2020-03-17 NOTE — OP NOTE
Laparoscopic Cholecystectomy/ Lap Ventral Hernia repair Procedure Note     Indications: This patient presents with symptomatic gallbladder disease and will undergo laparoscopic cholecystecomy. Pre-operative Diagnosis: Acute Cholecystitis     Post-operative Diagnosis: Acute on Chronic Cholecystitis, Incarcerated Ventral Hernia     Procedures:  1- Laparoscopic Cholecystectomy   2- Laparoscopic Incarcerated ventral hernia repair     Surgeon: Mayelin Mcintosh    Assistant: Dhaval Morris     Anesthesia: General endotracheal anesthesia     Procedure Details    The patient was seen again in the Holding Room. The risks, benefits, complications, treatment options, and expected outcomes were discussed with the patient. The possibilities of reaction to medication, pulmonary aspiration, perforation of viscus, bleeding, recurrent infection, finding a normal gallbladder, the need for additional procedures, failure to diagnose a condition, the possible need to convert to an open procedure, and creating a complication requiring transfusion or operation were discussed with the patient. The patient concurred with the proposed plan, giving informed consent. The site of surgery properly noted/marked. The patient was taken to Operating Room, properly identified and the procedure verified as Laparoscopic Cholecystectomy. A Time Out was held and the above information confirmed.     Prior to the induction of general anesthesia, antibiotic prophylaxis was administered. SCD's in place. General endotracheal anesthesia was then administered and tolerated well. After the induction, the abdomen was prepped in the usual sterile fashion.  The patient was positioned in the supine position with the arms comfortably positioned, along with some reverse trendelenberg.      Incision was made at Little's point in the left midclavicular line, Veress needle technique was used to achieve pneumoperitoneum which was then created with CO2 and tolerated well without any adverse changes in the patient's vital signs. Additional trocars were introduced under direct vision. All skin incisions were infiltrated with a local anesthetic agent before making the incision and placing the trocars.         On inspection the abdomen, there was no adverse events to placing the trocars. The gallbladder was identified, the fundus grasped and retracted cephalad. Adhesions were lysed bluntly and with the electrocautery where indicated, taking care not to injure any adjacent organs or viscus. The infundibulum was grasped and retracted laterally, exposing the peritoneum overlying the triangle of Calot. This was then divided and exposed in a blunt fashion. The cystic duct was clearly identified and bluntly dissected circumferentially. The junctions of the gallbladder, cystic duct and common bile duct were clearly identified prior to the division of any linear structure and Critical View of safety was obtained.      The 3 tenets of the critical view of safety that were achieved were as follows:     1.) The hepatocystic triangle was cleared of all fat and fibrous tissue  2.) The lower one-third of the gallbladder was  from the liver to expose the cystic plate  3.) Two and only two structures were seen to be going into the gallbladder      The cystic duct was then doubly ligated with surgical clips on the patient side and singly clipped on the gallbladder side and divided. The cystic artery was identified, dissected free, ligated with clips and divided as well.      The gallbladder was dissected from the liver bed in retrograde fashion with the electrocautery. The gallbladder was removed using the endo-catch bag. The liver bed was irrigated and inspected. Hemostasis was achieved with the electrocautery.  Copious irrigation was utilized and was repeatedly aspirated until clear.     Fascial closure device used to close the fascia at the 12 mm trocar.     Attention was then paid to a 1 cm ventral hernia around the umbilicus. This has a piece of pre-peritoneal fat incarcerated within in. This was reduced laparoscopically and sent for permanent pathologic examination and picture was taken Local anesthetic was then injected into the defect and a separate stab incision made into the hernia. A suture passer device with 0-vicryl suture was then used to repair the defect primarily. Multiple sutures were used to approximate the hernia defect without tension. Pneumoperitoneum was completely reduced after viewing removal of the trocars under direct vision. The wound was thoroughly irrigated the skin was then closed with 4.0 vicryl in subcuticular fashion, and dermabond was applied. Instrument, sponge, and needle counts were correct at closure and at the conclusion of the case.      Findings:  Cholecystitis with Cholelithiasis   Incarcerated ventral hernia      Estimated Blood Loss: Minimal      Drains: none      Total IV Fluids: 1000 ml      Specimens: Gallbladder, incarcerated hernia fat      Complications:  None; patient tolerated the procedure well.      Disposition: PACU - hemodynamically stable.      Condition: stable     Attending Attestation: I was present and scrubbed for the entire procedure.

## 2020-03-17 NOTE — ANESTHESIA PRE PROCEDURE
Department of Anesthesiology  Preprocedure Note       Name:  Diana Armas   Age:  28 y.o.  :  1985                                          MRN:  [id number]         Date:  3/17/2020      Surgeon: Mounika Davison): Jinny Walton DO    Procedure: LAPAROSCOPIC CHOLECYSTECTOMY (N/A )    Medications prior to admission:   Prior to Admission medications    Medication Sig Start Date End Date Taking?  Authorizing Provider   lisinopril (PRINIVIL;ZESTRIL) 20 MG tablet Take 20 mg by mouth daily 3/16/20   Historical Provider, MD   hydroCHLOROthiazide (HYDRODIURIL) 25 MG tablet Take 25 mg by mouth daily 3/16/20   Historical Provider, MD       Current medications:    Current Facility-Administered Medications   Medication Dose Route Frequency Provider Last Rate Last Dose    sodium chloride flush 0.9 % injection 10 mL  10 mL Intravenous 2 times per day Rosalva Gnosalez MD   10 mL at 20 0828    sodium chloride flush 0.9 % injection 10 mL  10 mL Intravenous PRN Rosalva Gonsalez MD        lactated ringers infusion   Intravenous Continuous Rosalva Gonsalez  mL/hr at 20 0237      ondansetron (ZOFRAN) injection 4 mg  4 mg Intravenous Q6H PRN Rosalva Gonsalez MD   4 mg at 20 0346    hydrALAZINE (APRESOLINE) injection 10 mg  10 mg Intravenous Q6H PRN Rosalva Gonsalez MD   10 mg at 20 0237    morphine (PF) injection 2 mg  2 mg Intravenous Q2H PRN Rosalva Gonsalez MD   2 mg at 20 1148    Or    morphine injection 4 mg  4 mg Intravenous Q2H PRN Rosalva Gonsalez MD   4 mg at 20 0346    enalaprilat (VASOTEC) injection 1.25 mg  1.25 mg Intravenous Q6H PRN Rosalva Gonsalez MD   1.25 mg at 20 0346    enoxaparin (LOVENOX) injection 30 mg  30 mg Subcutaneous BID Rosalva Gonsalez MD   30 mg at 20 0828    sodium chloride 0.9 % irrigation    Continuous PRN Jinny Walton, DO   500 mL at 20 1512    lactated ringers infusion    Continuous PRN Jinny Walton DO   1,000 mL at 20 1513 Facility-Administered Medications Ordered in Other Encounters   Medication Dose Route Frequency Provider Last Rate Last Dose    fentaNYL (SUBLIMAZE) injection    PRN Priscilla Saunders, APRN - CRNA   100 mcg at 03/17/20 1427    lidocaine (cardiac) (XYLOCAINE) injection    PRN Priscilla Saunders, APRN - CRNA   50 mg at 03/17/20 1418    propofol injection    PRN Priscilla Chip, APRN - CRNA   150 mg at 03/17/20 1418    dexamethasone (DECADRON) injection    PRN Priscilla Saunders, APRN - CRNA   8 mg at 03/17/20 1419    rocuronium [facility]) injection    PRN Priscilla Chip, APRN - CRNA   50 mg at 03/17/20 1418    HYDROmorphone (DILAUDID) injection    PRN Priscilla Chip, APRN - CRNA   2 mg at 03/17/20 1442    midazolam (VERSED) injection    PRN Priscilla Saunders, APRN - CRNA   2 mg at 03/17/20 1409    phenylephrine (VAZCULEP) injection    PRN Priscilla Saunders, APRN - CRNA   100 mcg at 03/17/20 1504       Allergies:  No Known Allergies    Problem List:    Patient Active Problem List   Diagnosis Code    Acute cholecystitis K81.0       Past Medical History:        Diagnosis Date    Hypertension        Past Surgical History:  No past surgical history on file.     Social History:    Social History     Tobacco Use    Smoking status: Never Smoker   Substance Use Topics    Alcohol use: Yes     Comment: occasionally                                Counseling given: Not Answered      Vital Signs (Current):   Vitals:    03/17/20 0356 03/17/20 0453 03/17/20 0819 03/17/20 1144   BP:  (!) 152/90 (!) 159/91 (!) 154/93   Pulse:   82 73   Resp:   15 15   Temp:   98 °F (36.7 °C) 98.1 °F (36.7 °C)   TempSrc:   Oral Oral   SpO2:   100% 99%   Weight: 290 lb (131.5 kg)      Height: 5' 6\" (1.676 m)                                                 BP Readings from Last 3 Encounters:   03/17/20 (!) 154/93   03/17/20 (!) 144/91   07/30/17 (!) 164/120       NPO Status: risks discussed with patient. Plan discussed with CRNA.                   Ivan Laws DO   3/17/2020

## 2020-03-17 NOTE — PLAN OF CARE
Problem: Pain:  Goal: Pain level will decrease  Description: Pain level will decrease  3/17/2020 0923 by Michael Andrade RN  Outcome: Ongoing  Note: Pain was a 6 this AM. Given 2mg morphine. Pt now asleep/resting. Will cont to monitor (in in mid-right UQ of abdomen)  3/17/2020 0530 by Ba Mckeon RN  Outcome: Ongoing  Note: Pt rating abdominal pain 8/10 on arrival to unit. PRN morphine given and pt was sleeping and when waking up said the pain has subsided. Will continue to monitor. Problem: Nausea/Vomiting:  Goal: Absence of nausea/vomiting  Description: Absence of nausea/vomiting  3/17/2020 0923 by Michael Andrade RN  Outcome: Ongoing  Note: No c/o NV. Pt currently resting with eyes shut. Will cont to monitor  3/17/2020 0530 by Ba Mckeon RN  Outcome: Ongoing  Note: PRN zofran given with benefit for nausea. Problem: MOBILITY  Goal: Early mobilization is achieved  Outcome: Ongoing  Note: Pt up w/ steady gait. Calls for assistance when needed. Currently in bed. Will encourage ambulation post op     Problem: Falls - Risk of:  Goal: Will remain free from falls  Description: Will remain free from falls  Outcome: Ongoing  Note: Nonskid socks on. Bed in lowest position. Wheels locked, 2/4 rails up. Will monitor.

## 2020-03-17 NOTE — ANESTHESIA POSTPROCEDURE EVALUATION
Department of Anesthesiology  Postprocedure Note    Patient: Kelli Goncalves  MRN: [id number]  YOB: 1985  Date of evaluation: 3/17/2020  Time:  6:46 PM     Procedure Summary     Date:  03/17/20 Room / Location:  [facility] [address]    Anesthesia Start:  6069 Anesthesia Stop:  9794    Procedure:  LAPAROSCOPIC CHOLECYSTECTOMY, LAPAROSCOPIC INCARCERATED VENTRAL HERNIA REPAIR (N/A ) Diagnosis:  (acute cholecystitis)    Surgeon: Val Munoz DO Responsible Provider:  Brigida Garcia DO    Anesthesia Type:  general ASA Status:  3          Anesthesia Type: No value filed. Jean-Claude Phase I: Jean-Claude Score: 8    Jean-Claude Phase II:      Last vitals: Reviewed and per EMR flowsheets.        Anesthesia Post Evaluation    Patient location during evaluation: PACU  Patient participation: complete - patient participated  Level of consciousness: awake and alert  Airway patency: patent  Nausea & Vomiting: no nausea and no vomiting  Cardiovascular status: blood pressure returned to baseline  Respiratory status: acceptable  Hydration status: euvolemic

## 2020-03-17 NOTE — PROGRESS NOTES
1617 Admitted to PACU from [address]. Connected to monitor. Report at bedside. Appears to sleep. Has HTN . Medicated for pain to r/o pain as source of HTN.

## 2020-03-17 NOTE — PROGRESS NOTES
Pt arrived back to floor VSS (on Rm Air and high BP). Lap sites CDI. C/o pain 8/10---given PRN morphine.  Electronically signed by Phil Finnegan RN on 3/17/2020 at 7:13 PM

## 2020-03-17 NOTE — CARE COORDINATION
Case Management Assessment           Initial Evaluation                Date / Time of Evaluation: 3/17/2020 1:46 PM                 Assessment Completed by: Emilie Nguyen    Patient Name: Rashel Rivera     YOB: 1985  Diagnosis: Acute cholecystitis [K81.0]  Acute cholecystitis [K81.0]     Date / Time: 3/16/2020  9:56 PM    Patient Admission Status: Inpatient    If patient is discharged prior to next notation, then this note serves as note for discharge by case management. Current PCP: Cuong ONTIVEROS Patient: No    Chart Reviewed: Yes  Patient/ Family Interviewed: Yes    Initial assessment completed at bedside with: pt    Hospitalization in the last 30 days: No    Emergency Contacts:  Extended Emergency Contact Information  Primary Emergency ContactAdy Degroot, [address]  Home Phone: [phone]  Relation: Other  Secondary Emergency Contact: Sharon Soto  Home Phone: [phone]  Relation: Aunt/Uncle    Advance Directives:   Code Status: Full [address]: No    Financial  Payor: Trevin Kirk / Plan: Jael Coma / Product Type: *No Product type* /     Pre-cert required for SNF: No    Pharmacy    [id number], [address] [place] [address]. LILLI Brothers [phone] - F [phone]  [address] [facility] [address]  Phone: [phone] Fax: [phone]      Potential assistance Purchasing Medications: Potential Assistance Purchasing Medications: Yes  Does Patient want to participate in local refill/ meds to beds program?: Yes    Meds To Beds General Rules:  1. Can ONLY be done Monday- Friday between 8:30am-5pm  2. Prescription(s) must be in pharmacy by 3pm to be filled same day  3. Copy of patient's insurance/ prescription drug card and patient face sheet must be sent along with the prescription(s)  4. Cost of Rx cannot be added to hospital bill.  If financial assistance is needed, please contact unit  or ;  or  CANNOT provide pharmacy voucher for patients co-pays  5. Patients can then  the prescription on their way out of the hospital at discharge, or pharmacy can deliver to the bedside if staff is available. (payment due at time of pick-up or delivery - cash, check, or card accepted)     Able to afford home medications/ co-pay costs: Yes    ADLS  Support Systems: Family Members, Spouse/Significant Other    PT AM-PAC:   /24  OT AM-PAC:   /24    [place]: from home independent  Steps:     Plans to RETURN to current housing: No  Barriers to RETURNING to current housing: none    Home Care Information  Currently ACTIVE with [address]: No  Home Care Agency: Not Applicable    Currently ACTIVE with [place] on Aging: No      Durable Medical Equipment  DME Provider: none  Equipment:     Home Oxygen and [address] prior to admission: No  [address]: Not Applicable  Other Respiratory Equipment:         Dialysis  Active with HD/PD prior to admission: No  Nephrologist:     HD Center:  Not Applicable    DISCHARGE PLAN:  Disposition: Home- No Services Needed    Transportation PLAN for discharge: family     Factors facilitating achievement of predicted outcomes: Family support, Cooperative and Pleasant    Barriers to discharge: none noted    Additional Case Management Notes: pt from home independent poss OR for lap jazmín, no needs anticipated at [address] for Transition of Care is related to the following treatment goals of Acute cholecystitis [K81.0]  Acute cholecystitis [K81.0]    The Patient and/or patient representative Mandie and her family were provided with a choice of provider and agrees with the discharge plan Yes    Freedom of choice list was provided with basic dialogue that supports the patient's individualized plan of care/goals and shares the quality data associated with the providers.  Not

## 2020-03-17 NOTE — BRIEF OP NOTE
Brief Postoperative Note  ______________________________________________________________    Patient: Arnaldo Madden  YOB: 1985  MRN: [id number]  Date of Procedure: 3/17/2020    Pre-Op Diagnosis: acute cholecystitis    Post-Op Diagnosis: Same       Procedure(s):  LAPAROSCOPIC CHOLECYSTECTOMY, LAPAROSCOPIC INCARCERATED VENTRAL HERNIA REPAIR    Anesthesia: Anesthesia type not filed in the log. Surgeon(s):   Royal Gramajo, DO    Assistant: Yissel Ross MD PGY5  Freedom Talley PGY1    Estimated Blood Loss (mL): less than 50     Complications: None    Specimens:   ID Type Source Tests Collected by Time Destination   A : GALLBLADDER  Tissue Tissue SURGICAL PATHOLOGY Royal Files, DO 3/17/2020 1529    B : INCARCERATED HERNIA  FAT Tissue Tissue SURGICAL PATHOLOGY Royal Files, DO 3/17/2020 1548        Implants:  * No implants in log *      Drains: * No LDAs found *    Findings: acute on chronic cholecystitis    Yissel Ross MD  Date: 3/17/2020  Time: 3:57 PM

## 2020-03-17 NOTE — H&P
no history of drug use. Review of Systems:   A 14 point review of systems was conducted, significant findings as noted in HPI. All other systems negative. Constitutional: Negative for chills and fever. HENT: Negative for congestion, facial swelling, and voice change. Eyes: Negative for photophobia and visual disturbance. Respiratory: Negative for apnea, cough, chest tightness and shortness of breath. Cardiovascular: Negative for chest pain and palpitations. Gastrointestinal: Negative for dysphagia and early satiety. Genitourinary: Negative for difficulty urinating, dysuria, flank pain, frequency and hematuria. Musculoskeletal: Negative for new gait problem, joint swelling and myalgias. Skin: Negative for color change, pallor and rash. Endocrine: negative for tremors, temperature intolerance or polydipsia. Allergic/Immunologic: Negative for new environmental or food allergies. Neurological: Negative for dizziness, seizures, speech difficulty, numbness. Hematological: Negative for adenopathy. Psychiatric/Behavioral: Negative for agitation and confusion.     Physical exam:    Vitals:    03/16/20 2245 03/16/20 2300 03/17/20 0000 03/17/20 0100   BP: (!) 209/119 (!) 212/124 (!) 193/120 (!) 192/116   Pulse: 83 83 83 86   SpO2: 100% 100% 100% 100%       General appearance: alert, resting in bed  Neuro: A&Ox3, no focal deficits  HENT: trachea midline, no JVD, no LAD  Eyes: PERRLA, no scleral icterus  Chest/Lungs: CTAB, no crackles/rales, wheezes/rhonchi   Cardiovascular: RRR, no murmurs/gallops/rubs  Abdomen: soft, obese, non-distended, tender in RUQ and epigastric region  Skin: warm and dry  Extremities: no edema , no cyanosis    Labs:    CBC:   Recent Labs     03/16/20  2219   WBC 9.3   HGB 11.6*   HCT 35.4*   MCV 79.9*        BMP:   Recent Labs     03/16/20  2219      K 3.6      CO2 22   PHOS 2.3*   BUN 10   CREATININE 0.8     PT/INR: No results for input(s): PROTIME, INR in the last 72 hours. APTT: No results for input(s): APTT in the last 72 hours. Liver Profile:  Lab Results   Component Value Date    AST 17 03/16/2020    ALT 13 03/16/2020    BILIDIR <0.2 03/16/2020    BILITOT <0.2 03/16/2020    ALKPHOS 47 03/16/2020   No results found for: CHOL, HDL, TRIG  UA:   Lab Results   Component Value Date    COLORU Yellow 03/17/2020    PHUR 6.0 03/17/2020    CLARITYU Clear 03/17/2020    SPECGRAV 1.025 03/17/2020    LEUKOCYTESUR Negative 03/17/2020    UROBILINOGEN 0.2 03/17/2020    BILIRUBINUR Negative 03/17/2020    BLOODU Negative 03/17/2020    GLUCOSEU Negative 03/17/2020       Imaging:   CT ABDOMEN PELVIS W IV CONTRAST   Final Result   1. Gallstones. No CT evidence of acute cholecystitis. 2.  Probable myomatous uterus. XR CHEST STANDARD (2 VW)   Final Result      No acute chest process. Assessment/Plan: This is a 28 y.o. female who presents with severe epigastric pain. Patient is afebrile, hypertensive. Labs WNL. CT scan obtained showing a large gallstone in the gallbladder, no wall thickening or pericholecystic fluid.  Given persistent pain and evidence of cholelithiasis, will plan to take the patient to the OR later today for laparoscopic cholecystectomy.     - NPO/IVF  - Ancef on call to the OR  - Hydralazine PRN for hypertension  - Urine pregnancy negative  - Consent signed and in the chart    Franklin Ramirez MD PGY-3  03/17/20  2:26 AM  [phone]

## 2020-03-17 NOTE — ED TRIAGE NOTES
Pt arrives with complaints of epigastric pain, tender to palpation. STates the pain started 2 hour ago and has become significantly worse.

## 2020-03-17 NOTE — PROGRESS NOTES
PACU Transfer Note    Vitals:    03/17/20 1830   BP: (!) 149/74   Pulse: 82   Resp: 20   Temp: 98.4 °F (36.9 °C)   SpO2: 99%   Pre-op 154/93    In: 1118 [I.V.:1118]  Out: 20     Pain assessment:  present - adequately treated  Pain Level: (appears to sleep)    Report given to Receiving unit TIFFANI - Lebron Hoff.     3/17/2020 6:43 PM

## 2020-03-18 VITALS
BODY MASS INDEX: 46.61 KG/M2 | WEIGHT: 290 LBS | RESPIRATION RATE: 16 BRPM | HEIGHT: 66 IN | SYSTOLIC BLOOD PRESSURE: 162 MMHG | TEMPERATURE: 98.2 F | HEART RATE: 99 BPM | DIASTOLIC BLOOD PRESSURE: 91 MMHG | OXYGEN SATURATION: 100 %

## 2020-03-18 LAB
ALBUMIN SERPL-MCNC: 3.5 G/DL (ref 3.4–5)
ALP BLD-CCNC: 45 U/L (ref 40–129)
ALT SERPL-CCNC: 26 U/L (ref 10–40)
ANION GAP SERPL CALCULATED.3IONS-SCNC: 13 MMOL/L (ref 3–16)
AST SERPL-CCNC: 35 U/L (ref 15–37)
BASOPHILS ABSOLUTE: 0 K/UL (ref 0–0.2)
BASOPHILS RELATIVE PERCENT: 0.1 %
BILIRUB SERPL-MCNC: <0.2 MG/DL (ref 0–1)
BILIRUBIN DIRECT: <0.2 MG/DL (ref 0–0.3)
BILIRUBIN, INDIRECT: NORMAL MG/DL (ref 0–1)
BUN BLDV-MCNC: 7 MG/DL (ref 7–20)
CALCIUM SERPL-MCNC: 8.3 MG/DL (ref 8.3–10.6)
CHLORIDE BLD-SCNC: 101 MMOL/L (ref 99–110)
CO2: 20 MMOL/L (ref 21–32)
CREAT SERPL-MCNC: 0.7 MG/DL (ref 0.6–1.1)
EOSINOPHILS ABSOLUTE: 0 K/UL (ref 0–0.6)
EOSINOPHILS RELATIVE PERCENT: 0 %
GFR AFRICAN AMERICAN: >60
GFR NON-AFRICAN AMERICAN: >60
GLUCOSE BLD-MCNC: 137 MG/DL (ref 70–99)
HCT VFR BLD CALC: 33.3 % (ref 36–48)
HEMOGLOBIN: 10.9 G/DL (ref 12–16)
LYMPHOCYTES ABSOLUTE: 1.1 K/UL (ref 1–5.1)
LYMPHOCYTES RELATIVE PERCENT: 9.4 %
MAGNESIUM: 2.1 MG/DL (ref 1.8–2.4)
MCH RBC QN AUTO: 26.3 PG (ref 26–34)
MCHC RBC AUTO-ENTMCNC: 32.8 G/DL (ref 31–36)
MCV RBC AUTO: 80.4 FL (ref 80–100)
MONOCYTES ABSOLUTE: 0.7 K/UL (ref 0–1.3)
MONOCYTES RELATIVE PERCENT: 6.5 %
NEUTROPHILS ABSOLUTE: 9.6 K/UL (ref 1.7–7.7)
NEUTROPHILS RELATIVE PERCENT: 84 %
PDW BLD-RTO: 16.2 % (ref 12.4–15.4)
PHOSPHORUS: 2.2 MG/DL (ref 2.5–4.9)
PLATELET # BLD: 254 K/UL (ref 135–450)
PMV BLD AUTO: 8.6 FL (ref 5–10.5)
POTASSIUM SERPL-SCNC: 4.7 MMOL/L (ref 3.5–5.1)
RBC # BLD: 4.15 M/UL (ref 4–5.2)
SODIUM BLD-SCNC: 134 MMOL/L (ref 136–145)
TOTAL PROTEIN: 6.6 G/DL (ref 6.4–8.2)
WBC # BLD: 11.4 K/UL (ref 4–11)

## 2020-03-18 PROCEDURE — 6360000002 HC RX W HCPCS: Performed by: STUDENT IN AN ORGANIZED HEALTH CARE EDUCATION/TRAINING PROGRAM

## 2020-03-18 PROCEDURE — 80069 RENAL FUNCTION PANEL: CPT

## 2020-03-18 PROCEDURE — 2580000003 HC RX 258: Performed by: STUDENT IN AN ORGANIZED HEALTH CARE EDUCATION/TRAINING PROGRAM

## 2020-03-18 PROCEDURE — 96372 THER/PROPH/DIAG INJ SC/IM: CPT

## 2020-03-18 PROCEDURE — 80076 HEPATIC FUNCTION PANEL: CPT

## 2020-03-18 PROCEDURE — 36415 COLL VENOUS BLD VENIPUNCTURE: CPT

## 2020-03-18 PROCEDURE — 85025 COMPLETE CBC W/AUTO DIFF WBC: CPT

## 2020-03-18 PROCEDURE — 6370000000 HC RX 637 (ALT 250 FOR IP): Performed by: STUDENT IN AN ORGANIZED HEALTH CARE EDUCATION/TRAINING PROGRAM

## 2020-03-18 PROCEDURE — G0378 HOSPITAL OBSERVATION PER HR: HCPCS

## 2020-03-18 PROCEDURE — 83735 ASSAY OF MAGNESIUM: CPT

## 2020-03-18 PROCEDURE — 96376 TX/PRO/DX INJ SAME DRUG ADON: CPT

## 2020-03-18 RX ORDER — DOCUSATE SODIUM 100 MG/1
100 CAPSULE, LIQUID FILLED ORAL 2 TIMES DAILY
Qty: 60 CAPSULE | Refills: 0 | Status: SHIPPED | OUTPATIENT
Start: 2020-03-18 | End: 2020-04-17

## 2020-03-18 RX ORDER — PANTOPRAZOLE SODIUM 20 MG/1
40 TABLET, DELAYED RELEASE ORAL DAILY
Qty: 14 TABLET | Refills: 0 | Status: SHIPPED | OUTPATIENT
Start: 2020-03-18 | End: 2020-03-25

## 2020-03-18 RX ORDER — OXYCODONE HYDROCHLORIDE AND ACETAMINOPHEN 5; 325 MG/1; MG/1
1 TABLET ORAL EVERY 6 HOURS PRN
Qty: 28 TABLET | Refills: 0 | Status: SHIPPED | OUTPATIENT
Start: 2020-03-18 | End: 2020-03-25

## 2020-03-18 RX ADMIN — Medication 10 ML: at 08:51

## 2020-03-18 RX ADMIN — ACETAMINOPHEN 1000 MG: 500 TABLET, COATED ORAL at 11:04

## 2020-03-18 RX ADMIN — ACETAMINOPHEN 1000 MG: 500 TABLET, COATED ORAL at 03:14

## 2020-03-18 RX ADMIN — DIBASIC SODIUM PHOSPHATE, MONOBASIC POTASSIUM PHOSPHATE AND MONOBASIC SODIUM PHOSPHATE 2 TABLET: 852; 155; 130 TABLET ORAL at 08:42

## 2020-03-18 RX ADMIN — MORPHINE SULFATE 2 MG: 2 INJECTION, SOLUTION INTRAMUSCULAR; INTRAVENOUS at 03:14

## 2020-03-18 RX ADMIN — OXYCODONE 10 MG: 5 TABLET ORAL at 01:14

## 2020-03-18 RX ADMIN — HYDRALAZINE HYDROCHLORIDE 10 MG: 20 INJECTION INTRAMUSCULAR; INTRAVENOUS at 08:44

## 2020-03-18 RX ADMIN — OXYCODONE 10 MG: 5 TABLET ORAL at 13:01

## 2020-03-18 RX ADMIN — OXYCODONE 10 MG: 5 TABLET ORAL at 06:24

## 2020-03-18 RX ADMIN — ENOXAPARIN SODIUM 30 MG: 30 INJECTION SUBCUTANEOUS at 08:41

## 2020-03-18 ASSESSMENT — PAIN DESCRIPTION - DESCRIPTORS
DESCRIPTORS: ACHING

## 2020-03-18 ASSESSMENT — PAIN DESCRIPTION - ONSET
ONSET: ON-GOING

## 2020-03-18 ASSESSMENT — PAIN DESCRIPTION - PROGRESSION
CLINICAL_PROGRESSION: GRADUALLY WORSENING

## 2020-03-18 ASSESSMENT — PAIN DESCRIPTION - ORIENTATION
ORIENTATION: MID

## 2020-03-18 ASSESSMENT — PAIN SCALES - GENERAL
PAINLEVEL_OUTOF10: 7
PAINLEVEL_OUTOF10: 7
PAINLEVEL_OUTOF10: 6
PAINLEVEL_OUTOF10: 9
PAINLEVEL_OUTOF10: 8

## 2020-03-18 ASSESSMENT — PAIN DESCRIPTION - PAIN TYPE
TYPE: SURGICAL PAIN

## 2020-03-18 ASSESSMENT — PAIN DESCRIPTION - FREQUENCY
FREQUENCY: CONTINUOUS

## 2020-03-18 ASSESSMENT — PAIN DESCRIPTION - LOCATION
LOCATION: ABDOMEN

## 2020-03-18 NOTE — PROGRESS NOTES
This RN received discharge order. Pt informed of discharge order, went over discharge instructions and medication. Pt signed discharge paper. This RN asked patient if she need assistance to get dresses. Pt verbalized she was ok and would call once ride was outside. Pt called out and stated she was ready, upon entering the room This RN noticed patient was in pain, Pt was crying, This RN asked NP to assess Pt, Pt was instructed to get pain medication and wait for reassessment. Pt verbalized she still wanted to go home and that her ride was out side, pt also verbalize she is ok. This RN administer pain medication ( ok per NP). This RN also asked patient multiple times if she was ok to leave, Pt repeatedly verbalize she was fine. Incision remained CDI, abdominal binder place.

## 2020-03-18 NOTE — PROGRESS NOTES
Pt alert and oriented. VSS with exceptions to BP. Pt pain is controlled on Prn medication and schedule medication. Pt tolerating diet well. Pt up as tolerated. Surgical site CDI. Pt currently resting in bed with belongings and call light in reach. Will continue to monitor.

## 2020-03-18 NOTE — PROGRESS NOTES
Department of Surgery:  Post-op Note    CC: Acute cholecystitis    Subjective:   Having some eden-incisional pain around her umbilicus, say she feels very tired/worn out. Has not had anything to eat or drink, denies nausea or vomiting. Has not voided.     Objective:  Anesthesia type: General      I/O    Intra op    Post op     Fluids  2000 mL 500 mL     EBL 20 mL 0 mL     Urine 0 mL 0 mL     Physical Exam:  Vitals:    03/17/20 1810 03/17/20 1820 03/17/20 1830 03/17/20 1854   BP: (!) 156/74 (!) 146/79 (!) 149/74 (!) 155/96   Pulse: 78 81 82 84   Resp: 21 20 20 20   Temp:   98.4 °F (36.9 °C) 98.5 °F (36.9 °C)   TempSrc:    Oral   SpO2: 99% 98% 99% 100%   Weight:       Height:           General appearance: alert, no acute distress  Chest/Lungs: normal effort, no accessory muscle use, on 3L NC  Cardiovascular: RRR  Abdomen: soft, appropriately tender, non-distended, incisions c/d/i  Extremities: no edema, no cyanosis  Neuro: A&Ox3, no focal deficits, sensation intact    Assessment and Plan  This is a 28y.o. year old female with a diagnosis of acute cholecystitis s/p lap cholecystectomy, lap incarcerated ventral hernia repair POD #0    Pain management: morphine pain panel, Roxicodone pain panel  Cardiovascular: PRN hydralazine  Respiratory: extubated, encourage hourly incentive spirometry and deep breathing  FEN:  Fluids: , Diet: CLD  : Has not voided, void check by 00:00  Wound: local care  Ambulation: OOB to chair, encourage ambulation  Prophylaxis: dontrell Sampson DO PGY1  General Surgery Resident  03/17/20 8:06 PM  [phone]

## 2020-03-18 NOTE — CARE COORDINATION
Case Management Assessment            Discharge Note                    Date / Time of Note: 3/18/2020 10:29 AM                  Discharge Note Completed by: Kandis Sinclair    Patient Name: Angi Richey   YOB: 1985  Diagnosis: Acute cholecystitis [K81.0]  Acute cholecystitis [K81.0]   Date / Time: 3/16/2020  9:56 PM    Current PCP: Cuong ONTIVEROS patient: No    Hospitalization in the last 30 days: No    Advance Directives:  Code Status: Full Code  [address] DNR form completed and on chart: No    Financial:  Payor: Isabella Huynh / Plan: Trish Jillian / Product Type: *No Product type* /      Pharmacy:    [facility]/pharmacy [address] [place] [address], [address] [place] [address]. LILLI Saavedra [phone] - F [phone]  [address] Hernandez [facility] [address]  Phone: [phone] Fax: [phone]      Assistance purchasing medications?: Potential Assistance Purchasing Medications: Yes  Assistance provided by Case Management: None at this time    Does patient want to participate in local refill/ meds to beds program?: Yes    Meds To Beds General Rules:  1. Can ONLY be done Monday- Friday between 8:30am-5pm  2. Prescription(s) must be in pharmacy by 3pm to be filled same day  3. Copy of patient's insurance/ prescription drug card and patient face sheet must be sent along with the prescription(s)  4. Cost of Rx cannot be added to hospital bill. If financial assistance is needed, please contact unit  or ;  or  CANNOT provide pharmacy voucher for patients co-pays  5.  Patients can then  the prescription on their way out of the hospital at discharge, or pharmacy can deliver to the bedside if staff is available. (payment due at time of pick-up or delivery - cash, check, or card accepted)     Able to afford home medications/ co-pay costs: Yes    ADLS:  Current PT AM-PAC Score:   /24  Current OT AM-PAC Score:   /24      DISCHARGE Disposition: Home- No Services Needed    LOC at discharge: Not Applicable  NABILA Completed: Not Indicated    Notification completed in HENS/PAS?:  Not Applicable    IMM Completed:   Not Indicated    Transportation:  Transportation PLAN for discharge: family   Mode of Transport: [address] ordered at discharge: Not [address]: Not Applicable  Orders faxed: No    Durable Medical Equipment:  DME Provider: none  Equipment obtained during hospitalization:     Home Oxygen and Respiratory Equipment:  Oxygen needed at discharge?: Not [address]: Not Applicable  Portable tank available for discharge?: Not Indicated    Dialysis:  Dialysis patient: No    Dialysis Center:  Not Applicable      Additional CM Notes: pt from home will return no needs family to take home    The Plan for Transition of Care is related to the following treatment goals of Acute cholecystitis [K81.0]  Acute cholecystitis [K81.0]    The Patient and/or patient representative Mandie and her family were provided with a choice of provider and agrees with the discharge plan Yes    Freedom of choice list was provided with basic dialogue that supports the patient's individualized plan of care/goals and shares the quality data associated with the providers.  Not Indicated    Care Transitions patient: No    Chelsey Ortiz RN  [facility]  Case Management Department  Ph: [phone]  Fax: [phone]

## 2022-07-14 VITALS
OXYGEN SATURATION: 98 % | SYSTOLIC BLOOD PRESSURE: 188 MMHG | HEART RATE: 88 BPM | RESPIRATION RATE: 18 BRPM | TEMPERATURE: 98.4 F | DIASTOLIC BLOOD PRESSURE: 104 MMHG

## 2022-07-14 PROCEDURE — 99283 EMERGENCY DEPT VISIT LOW MDM: CPT

## 2022-07-14 ASSESSMENT — PAIN - FUNCTIONAL ASSESSMENT: PAIN_FUNCTIONAL_ASSESSMENT: 0-10

## 2022-07-14 ASSESSMENT — PAIN DESCRIPTION - LOCATION: LOCATION: VAGINA

## 2022-07-14 ASSESSMENT — PAIN DESCRIPTION - DESCRIPTORS: DESCRIPTORS: ACHING;DISCOMFORT

## 2022-07-14 ASSESSMENT — PAIN DESCRIPTION - PAIN TYPE: TYPE: ACUTE PAIN

## 2022-07-14 ASSESSMENT — PAIN SCALES - GENERAL: PAINLEVEL_OUTOF10: 10

## 2022-07-15 ENCOUNTER — HOSPITAL ENCOUNTER (EMERGENCY)
Age: 37
Discharge: HOME | End: 2022-07-15
Attending: EMERGENCY MEDICINE
Payer: COMMERCIAL

## 2022-07-15 DIAGNOSIS — N94.89 MASS OF FEMALE GENITAL STRUCTURE: Primary | ICD-10-CM

## 2022-07-15 RX ORDER — CEPHALEXIN 500 MG/1
500 CAPSULE ORAL 4 TIMES DAILY
Qty: 28 CAPSULE | Refills: 0 | Status: SHIPPED | OUTPATIENT
Start: 2022-07-15 | End: 2022-07-22

## 2022-07-15 RX ORDER — OXYCODONE HYDROCHLORIDE 5 MG/1
5 TABLET ORAL ONCE
Status: DISCONTINUED | OUTPATIENT
Start: 2022-07-15 | End: 2022-07-15 | Stop reason: HOSPADM

## 2022-07-15 RX ORDER — OXYCODONE HYDROCHLORIDE 5 MG/1
5 TABLET ORAL EVERY 6 HOURS PRN
Qty: 10 TABLET | Refills: 0 | Status: SHIPPED | OUTPATIENT
Start: 2022-07-15 | End: 2022-07-25

## 2022-07-15 RX ORDER — SULFAMETHOXAZOLE AND TRIMETHOPRIM 800; 160 MG/1; MG/1
1 TABLET ORAL 2 TIMES DAILY
Qty: 14 TABLET | Refills: 0 | Status: SHIPPED | OUTPATIENT
Start: 2022-07-15 | End: 2022-07-22

## 2022-07-15 NOTE — ED NOTES
Pt refused oxycodone. TIFFANI Bustillos witnessed disposal of oxycodone pulled from [place].  Pt discharged to home      [facility]  07/15/22 8104

## 2022-07-15 NOTE — ED PROVIDER NOTES
ED Attending Attestation Note     Date of evaluation: 7/14/2022    This patient was seen by the resident. I have seen and examined the patient, agree with the workup, evaluation, management and diagnosis. The care plan has been discussed. My assessment reveals a very uncomfortable, although generally overall well-appearing patient, in no acute respiratory distress. She presents to the emergency department with complaints of a vaginal mass and pain that she states has been ongoing several months. As the pain has worsened in the last couple of weeks, she has become concerned that this represents an abscess, presents to the emergency department tonight. She denies any antecedent trauma to the area. She has not noticed any drainage. On examination, the patient does not have an abscess to the vaginal area, rather she has a small pendulous mass that appears to be arising from the superior aspect of the right labia minora, and dangles somewhat downward toward the vaginal introitus. The mass is perhaps 3 to 4 cm in length, and similarly in width and appears to have a more narrow \"stem\" at its base. It is very tender to palpation, although not warm or erythematous, and not indurated or fluctuant. It is not certain what this mass represents. History and examination is not consistent with an acute soft tissue infectious process, such as an abscess. It is most certainly not in the appropriate location to represent a Bartholin gland cyst.  Patient does have a very slight area of ulceration to the inferior most aspect of this mass, and has had increased pain recently, there is a potential for bacterial superinfection, so antibiosis has been initiated. However, it is most important that she follows up with gynecology as an outpatient for further evaluation and management.          Grayson Banuelos MD  07/19/22 3459

## 2022-07-15 NOTE — DISCHARGE INSTRUCTIONS
You were seen in the ED today for a mass in your vaginal area. It is unclear what this mass may be. I have sent you a prescription for two antibiotics called Keflex and Bactrim that you will need to take in case this mass is infected. Please call [phone] to schedule an outpatient appointment with gynecology at [facility]. I have also sent a prescription for a few oxycodone tablets that you can use as needed for severe pain. Otherwise, you can take tylenol and ibuprofen alternating every 4-6 hours as needed. Please return to the ED for any new or worsening symptoms.

## 2022-07-15 NOTE — ED PROVIDER NOTES
[address] [facility] RESIDENT NOTE       Date of evaluation: 7/14/2022    Chief Complaint     Abscess (possible abscess/cyst on vagina on going for  about a month states thought it would get smaller and go away on own and has gotten bigger. )      of Present Illness     Sammy Sanders is a 40 y.o. female who presents to the emergency department for vaginal pain. The patient states that she has had this pain and a mass near her vagina for for the past 4 months. The patient does not currently follow with an OB/GYN. The patient states for the past few weeks the mass has gradually increased in size. She has never had anything like this before. She reports that nothing makes the pain better, but that it is made worse with wiping and sometimes repositioning. The patient has not noticed any discharge from the abscess and denies any vaginal bleeding or discharge. Denies any prior history of STI. Review of Systems     Review of Systems   Constitutional: Negative. HENT: Negative. Eyes: Negative. Respiratory: Negative. Cardiovascular: Negative. Gastrointestinal: Negative. Endocrine: Negative. Genitourinary: Negative. Negative for dysuria, vaginal bleeding and vaginal discharge. Musculoskeletal: Negative. Skin: Negative. Allergic/Immunologic: Negative. Neurological: Negative. Numbness: pain with wiping. Psychiatric/Behavioral: Negative. Past Medical, Surgical, Family, and Social History     She has a past medical history of Hypertension. She has a past surgical history that includes Cholecystectomy, laparoscopic (N/A, 3/17/2020). Her family history is not on file. She reports current alcohol use. She reports that she does not use drugs.     Medications     Discharge Medication List as of 7/15/2022  3:27 AM        CONTINUE these medications which have NOT CHANGED    Details   pantoprazole (PROTONIX) 20 MG tablet Take 2 tablets by mouth daily for 7 days, Disp-14 tablet, R-0Print      lisinopril (PRINIVIL;ZESTRIL) 20 MG tablet Take 20 mg by mouth dailyHistorical Med      hydroCHLOROthiazide (HYDRODIURIL) 25 MG tablet Take 25 mg by mouth dailyHistorical Med             Allergies     She has No Known Allergies. Physical Exam     INITIAL VITALS: BP: (S) (!) 188/104 (takes bp meds hasnt today), Temp: 98.4 °F (36.9 °C), Heart Rate: 88, Resp: 18, SpO2: 98 %   Physical Exam  Constitutional:       Appearance: Normal appearance. HENT:      Head: Normocephalic and atraumatic. Nose: Nose normal.      Mouth/Throat:      Mouth: Mucous membranes are moist.   Eyes:      Extraocular Movements: Extraocular movements intact. Pupils: Pupils are equal, round, and reactive to light. Cardiovascular:      Rate and Rhythm: Regular rhythm. Pulses: Normal pulses. Heart sounds: Normal heart sounds. Abdominal:      Palpations: Abdomen is soft. Tenderness: There is no abdominal tenderness. Genitourinary:     General: Normal vulva. Comments: There is a round mass on a stalk noted on the right labia minora. There is a small area of ulceration medially without any drainage noted. Exquisitely tender to touch. Musculoskeletal:         General: Normal range of motion. Cervical back: Normal range of motion and neck supple. Skin:     General: Skin is warm and dry. Capillary Refill: Capillary refill takes less than 2 seconds. Neurological:      General: No focal deficit present. Mental Status: She is alert and oriented to person, place, and time. Psychiatric:         Mood and Affect: Mood normal.         Behavior: Behavior normal.       DiagnosticResults     EKG   None    RADIOLOGY:  No orders to display       LABS:   No results found for this visit on 07/15/22. ED BEDSIDE ULTRASOUND:  No results found.     RECENT VITALS:  BP: (S) (!) 188/104 (takes bp meds hasnt today), Temp: 98.4 °F (36.9 °C), Heart Rate: 88,Resp: 18, SpO2: 98 %     Procedures         ED Course     Nursing Notes, Past Medical Hx, Past Surgical Hx, Social Hx, Allergies, and Family Hx were reviewed. The patient was given the followingmedications:  Orders Placed This Encounter   Medications    DISCONTD: oxyCODONE (ROXICODONE) immediate release tablet 5 mg    cephALEXin (KEFLEX) 500 MG capsule     Sig: Take 1 capsule by mouth in the morning and 1 capsule at noon and 1 capsule in the evening and 1 capsule before bedtime. Do all this for 7 days. Dispense:  28 capsule     Refill:  0    sulfamethoxazole-trimethoprim (BACTRIM DS;SEPTRA DS) 800-160 MG per tablet     Sig: Take 1 tablet by mouth in the morning and 1 tablet before bedtime. Do all this for 7 days. Dispense:  14 tablet     Refill:  0    oxyCODONE (ROXICODONE) 5 MG immediate release tablet     Sig: Take 1 tablet by mouth every 6 hours as needed for Pain for up to 5 doses. WARNING:  May cause drowsiness. May impair ability to operate vehicles or machinery. Do not use in combination with alcohol. Dispense:  10 tablet     Refill:  0       CONSULTS:  None    MEDICAL DECISION MAKING / ASSESSMENT / Abiolafercho Cool is a 40 y.o. female who presents with pain in her external genitalia concern for abscess of right labia minora. On examination, the patient was uncomfortable . Offered the patient pain medication but she declined. Exam revealed pedunculated mass on superior aspect of right labia minor with ulcerated area medially. This was not consistent with abscess necessarily and did not appreciate other pathology such as bartholin's cyst. It was unclear the cause of this mass, but did not feel comfortable incising it. Ultimately, I started the patient on keflex for possible infection and instructed her to follow up with gynecology at [facility]. She was ultimately deemed safe for discharge home. This patient was also evaluated by the attending physician.  All care plans werediscussed and agreed upon. Clinical Impression     1. Mass of female genital structure        Disposition     PATIENT REFERRED TO:  Elba Andersen  [address]  [address] Internal Medicine  [place]  [phone]    Schedule an appointment as soon as possible for a visit       [facility]  Please call [phone] to schedule an appointment. DISCHARGE MEDICATIONS:  Discharge Medication List as of 7/15/2022  3:27 AM        START taking these medications    Details   cephALEXin (KEFLEX) 500 MG capsule Take 1 capsule by mouth in the morning and 1 capsule at noon and 1 capsule in the evening and 1 capsule before bedtime. Do all this for 7 days. , Disp-28 capsule, R-0Normal      sulfamethoxazole-trimethoprim (BACTRIM DS;SEPTRA DS) 800-160 MG per tablet Take 1 tablet by mouth in the morning and 1 tablet before bedtime. Do all this for 7 days. , Disp-14 tablet, R-0Normal      oxyCODONE (ROXICODONE) 5 MG immediate release tablet Take 1 tablet by mouth every 6 hours as needed for Pain for up to 5 doses. WARNING:  May cause drowsiness. May impair ability to operate vehicles or machinery.   Do not use in combination with alcohol., Disp-10 tablet, R-0Normal             DISPOSITION Decision To Discharge 07/15/2022 03:17:34 AM     Roberto Merritt MD  Resident  07/18/22 0671

## 2022-07-18 ASSESSMENT — ENCOUNTER SYMPTOMS
RESPIRATORY NEGATIVE: 1
GASTROINTESTINAL NEGATIVE: 1
ALLERGIC/IMMUNOLOGIC NEGATIVE: 1
EYES NEGATIVE: 1

## 2023-02-13 ENCOUNTER — HOSPITAL ENCOUNTER (EMERGENCY)
Age: 38
Discharge: HOME | End: 2023-02-13
Payer: MEDICAID

## 2023-02-13 ENCOUNTER — APPOINTMENT (OUTPATIENT)
Dept: GENERAL RADIOLOGY | Age: 38
End: 2023-02-13
Payer: MEDICAID

## 2023-02-13 VITALS
RESPIRATION RATE: 20 BRPM | DIASTOLIC BLOOD PRESSURE: 153 MMHG | OXYGEN SATURATION: 100 % | TEMPERATURE: 98.9 F | HEART RATE: 96 BPM | SYSTOLIC BLOOD PRESSURE: 214 MMHG

## 2023-02-13 DIAGNOSIS — S93.401A SPRAIN OF RIGHT ANKLE, UNSPECIFIED LIGAMENT, INITIAL ENCOUNTER: Primary | ICD-10-CM

## 2023-02-13 PROCEDURE — 73610 X-RAY EXAM OF ANKLE: CPT

## 2023-02-13 PROCEDURE — 99283 EMERGENCY DEPT VISIT LOW MDM: CPT

## 2023-02-13 RX ORDER — IBUPROFEN 600 MG/1
600 TABLET ORAL 3 TIMES DAILY PRN
Qty: 30 TABLET | Refills: 0 | Status: SHIPPED | OUTPATIENT
Start: 2023-02-13

## 2023-02-13 ASSESSMENT — ENCOUNTER SYMPTOMS
DIARRHEA: 0
NAUSEA: 0
SHORTNESS OF BREATH: 0
VOMITING: 0
ABDOMINAL PAIN: 0
EYE PAIN: 0
COUGH: 0
CONSTIPATION: 0
BACK PAIN: 0
RHINORRHEA: 0
SORE THROAT: 0

## 2023-02-13 ASSESSMENT — PAIN SCALES - GENERAL: PAINLEVEL_OUTOF10: 7

## 2023-02-13 ASSESSMENT — PAIN - FUNCTIONAL ASSESSMENT: PAIN_FUNCTIONAL_ASSESSMENT: 0-10

## 2023-02-13 NOTE — Clinical Note
Author Gris was seen and treated in our emergency department on 2/13/2023. She may return to work on 02/14/2023. If you have any questions or concerns, please don't hesitate to call.       ATIYA Don

## 2023-02-13 NOTE — ED PROVIDER NOTES
[facility]        Pt Name: Migdalia Hicks  MRN: [id number]  [place] 1985  Date of evaluation: 2/13/2023  Provider: ATIYA Cohen  PCP: Arianna Velasquez  Note Started: 6:09 PM EST 2/13/23      SHAAN. I have evaluated this patient. My supervising physician was available for consultation. CHIEF COMPLAINT       Chief Complaint   Patient presents with    Ankle Pain     Patient states fell the day before yesterday and noticed swelling that started yesterday. HISTORY OF PRESENT ILLNESS: 1 or more Elements     History from : Patient    Limitations to history : None    Migdalia Hicks is a 45 y.o. female who presents to the emergency room due to right ankle pain after rolling her ankle stepping off a curb approximately 2 days ago. Patient has noticed some swelling and worsening pain over the lateral aspect of the right ankle. Patient has been icing the area and taking Tylenol and ibuprofen for pain. Patient denies any other symptoms at this time. She states that she does have high blood pressure and does take medication for this. She denies any chest pain, shortness of breath difficulty breathing, headaches, vision changes, nausea vomiting or abdominal pain. Nursing Notes were all reviewed and agreed with or any disagreements were addressed in the HPI. REVIEW OF SYSTEMS :      Review of Systems   Constitutional:  Negative for chills, diaphoresis and fever. HENT:  Negative for congestion, rhinorrhea and sore throat. Eyes:  Negative for pain and visual disturbance. Respiratory:  Negative for cough and shortness of breath. Cardiovascular:  Negative for chest pain and leg swelling. Gastrointestinal:  Negative for abdominal pain, constipation, diarrhea, nausea and vomiting. Genitourinary:  Negative for difficulty urinating, dysuria and frequency. Musculoskeletal:  Positive for joint swelling.  Negative for back pain and neck pain. Right ankle pain and swelling   Skin:  Negative for rash and wound. Neurological:  Negative for dizziness and light-headedness. Positives and Pertinent negatives as per HPI. SURGICAL HISTORY     Past Surgical History:   Procedure Laterality Date    CHOLECYSTECTOMY, LAPAROSCOPIC N/A 3/17/2020    LAPAROSCOPIC CHOLECYSTECTOMY, LAPAROSCOPIC INCARCERATED VENTRAL HERNIA REPAIR performed by Rodney Moss DO at [address]       Discharge Medication List as of 2/13/2023  8:01 PM        CONTINUE these medications which have NOT CHANGED    Details   pantoprazole (PROTONIX) 20 MG tablet Take 2 tablets by mouth daily for 7 days, Disp-14 tablet, R-0Print      lisinopril (PRINIVIL;ZESTRIL) 20 MG tablet Take 20 mg by mouth dailyHistorical Med      hydroCHLOROthiazide (HYDRODIURIL) 25 MG tablet Take 25 mg by mouth dailyHistorical Med             ALLERGIES     Patient has no known allergies. FAMILYHISTORY     History reviewed. No pertinent family history. SOCIAL HISTORY       Social History     Tobacco Use    Smoking status: Never   Substance Use Topics    Alcohol use: Yes     Comment: occasionally    Drug use: No       SCREENINGS        [place] Coma Scale  Eye Opening: Spontaneous  Best Verbal Response: Oriented  Best Motor Response: Obeys commands  [place] Coma Scale Score: 15                CIWA Assessment  BP: (!) 214/153  Heart Rate: 96           PHYSICAL EXAM  1 or more Elements     ED Triage Vitals   BP Temp Temp Source Heart Rate Resp SpO2 Height Weight   02/13/23 1746 02/13/23 1744 02/13/23 1744 02/13/23 1744 02/13/23 1744 02/13/23 1744 -- --   (!) 214/153 98.9 °F (37.2 °C) Oral 96 20 100 %         Physical Exam  Vitals and nursing note reviewed. Constitutional:       General: She is not in acute distress. Appearance: She is not ill-appearing. Cardiovascular:      Rate and Rhythm: Normal rate and regular rhythm. Pulses: Normal pulses.       Heart sounds: Normal heart sounds. Pulmonary:      Effort: Pulmonary effort is normal.      Breath sounds: Normal breath sounds. Musculoskeletal:      Right lower leg: Normal. No edema. Left lower leg: No edema. Right ankle: Tenderness present over the lateral malleolus. No medial malleolus, base of 5th metatarsal or proximal fibula tenderness. Anterior drawer test negative. Normal pulse. Right Achilles Tendon: Normal.      Left ankle: Normal.   Neurological:      Mental Status: She is alert and oriented to person, place, and time. Psychiatric:         Mood and Affect: Mood normal.         Behavior: Behavior normal.           DIAGNOSTIC RESULTS   LABS:    Labs Reviewed - No data to display    When ordered only abnormal lab results are displayed. All other labs were within normal range or not returned as of this dictation. EKG: When ordered, EKG's are interpreted by the Emergency Department Physician in the absence of a cardiologist.  Please see their note for interpretation of EKG. RADIOLOGY:   Non-plain film images such as CT, Ultrasound and MRI are read by the radiologist. Plain radiographic images are visualized and preliminarily interpreted by the ED Provider with the below findings:        Interpretation per the Radiologist below, if available at the time of this note:    XR ANKLE RIGHT (MIN 3 VIEWS)   Final Result   Mild soft tissue swelling. No acute fracture or dislocation. No results found. No results found. PROCEDURES   Unless otherwise noted below, none     Procedures    CRITICAL CARE TIME (.cctime)       PAST MEDICAL HISTORY      has a past medical history of Hypertension.      Chronic Conditions affecting Care:     EMERGENCY DEPARTMENT COURSE and DIFFERENTIAL DIAGNOSIS/MDM:   Vitals:    Vitals:    02/13/23 1744 02/13/23 1746   BP:  (!) 214/153   Pulse: 96    Resp: 20    Temp: 98.9 °F (37.2 °C)    TempSrc: Oral    SpO2: 100%        Patient was given the following medications:  Medications - No data to display          Is this patient to be included in the SEP-1 Core Measure due to severe sepsis or septic shock? No   Exclusion criteria - the patient is NOT to be included for SEP-1 Core Measure due to: Infection is not suspected    CONSULTS: (Who and What was discussed)  None  Discussion with Other Profesionals : None    Social Determinants : None    Records Reviewed : None    CC/HPI Summary, DDx, ED Course, and Reassessment: 60-year-old female presents emergency department due to right ankle pain. On exam patient does have some mild to moderate tenderness over the lateral malleolus of the right ankle. There is no rashes or ecchymosis noted about the skin. Patient does not have any calf pain or lower leg swelling. X-ray of the right ankle was ordered to assess for fracture. X-ray of the right ankle did not reveal any osseous abnormalities. Patient will be discharged with ankle brace, anti-inflammatory medication and to follow-up primary care doctor next week for recheck. Low suspicion for osteomyelitis, tenosynovitis, fracture, septic arthritis, reactive arthritis, gout, Lisfranc injury, Moore or pseudo Moore fracture or other emergent etiologies at this time. Patient eloped from the emergency department prior to discharge and was unable to recheck her blood pressure and to go over test results with her. I am the Primary Clinician of Record. FINAL IMPRESSION      1.  Sprain of right ankle, unspecified ligament, initial encounter          DISPOSITION/PLAN     DISPOSITION Eloped - Left Before Treatment Complete 02/13/2023 08:02:18 PM      PATIENT REFERRED TO:  [facility]  [address]  [address] Internal Medicine  [facility] [address] [facility]    Schedule an appointment as soon as possible for a visit in 1 week  recheck    DISCHARGE MEDICATIONS:  Discharge Medication List as of 2/13/2023  8:01 PM        START taking these medications Details   ibuprofen (ADVIL;MOTRIN) 600 MG tablet Take 1 tablet by mouth 3 times daily as needed for Pain, Disp-30 tablet, R-0Normal             DISCONTINUED MEDICATIONS:  Discharge Medication List as of 2/13/2023  8:01 PM                 (Please note that portions of this note were completed with a voice recognition program.  Efforts were made to edit the dictations but occasionally words are mis-transcribed.)    ATIYA Mckenzie (electronically signed)            ATIYA Mckenzie  02/13/23 2002

## 2023-02-13 NOTE — DISCHARGE INSTRUCTIONS
Wear ankle brace over the next week throughout the day may take this off at night and to shower.     Continue Tylenol ibuprofen for pain    Follow-up with primary care doctor next week for recheck

## (undated) DEVICE — GARMENT,MEDLINE,DVT,INT,CALF,MED, GEN2: Brand: MEDLINE

## (undated) DEVICE — LAPAROSCOPIC SCISSORS: Brand: EPIX LAPAROSCOPIC SCISSORS

## (undated) DEVICE — SPONGE,LAP,4"X18",XR,ST,5/PK,40PK/CS: Brand: MEDLINE INDUSTRIES, INC.

## (undated) DEVICE — SURGICAL SET UP - SURE SET: Brand: MEDLINE INDUSTRIES, INC.

## (undated) DEVICE — [HIGH FLOW INSUFFLATOR,  DO NOT USE IF PACKAGE IS DAMAGED,  KEEP DRY,  KEEP AWAY FROM SUNLIGHT,  PROTECT FROM HEAT AND RADIOACTIVE SOURCES.]: Brand: PNEUMOSURE

## (undated) DEVICE — Z INACTIVE USE 2735373 APPLICATOR FBR LAIN COT WOOD TIP ECONOMICAL

## (undated) DEVICE — SUTURE VCRL SZ 0 L54IN ABSRB VLT W/O NDL POLYGLACTIN 910 J616H

## (undated) DEVICE — DRAPE,LAP,CHOLE,W/TROUGHS,STERILE: Brand: MEDLINE

## (undated) DEVICE — TROCAR: Brand: KII FIOS FIRST ENTRY

## (undated) DEVICE — NEEDLE INSUF L120MM DIA2MM DISP FOR PNEUMOPERI ENDOPATH

## (undated) DEVICE — CORD ES L10FT MPLR LAP

## (undated) DEVICE — PLATE ES AD W 9FT CRD 2

## (undated) DEVICE — GLOVE ORANGE PI 7   MSG9070

## (undated) DEVICE — TISSUE RETRIEVAL SYSTEM: Brand: INZII RETRIEVAL SYSTEM

## (undated) DEVICE — STANDARD HYPODERMIC NEEDLE,POLYPROPYLENE HUB: Brand: MONOJECT

## (undated) DEVICE — SUTURE VCRL SZ 4-0 L18IN ABSRB UD L19MM PS-2 3/8 CIR PRIM J496H

## (undated) DEVICE — Device

## (undated) DEVICE — ANTI-FOG SOLUTION WITH FOAM PAD: Brand: DEVON

## (undated) DEVICE — ADHESIVE SKIN CLSR 0.7ML TOP DERMBND ADV

## (undated) DEVICE — SYSTEM SMK EVAC LAP TBNG FILTER HSNG BENT STYL PNK SEE CLR

## (undated) DEVICE — Z INACTIVE USE 2641839 CLIP INT M L POLYMER LOK LIG HEM O LOK

## (undated) DEVICE — JEWISH HOSPITAL TURNOVER KIT: Brand: MEDLINE INDUSTRIES, INC.

## (undated) DEVICE — DISSECTOR LAP DIA5MM BLNT TIP ENDOPATH

## (undated) DEVICE — SOLUTION INJ LR VISIV 1000ML BG

## (undated) DEVICE — COVER LT HNDL BLU PLAS

## (undated) DEVICE — GLOVE SURG SZ 75 L12IN THK75MIL DK GRN LTX FREE

## (undated) DEVICE — ELECTROSURGICAL PENCIL ROCKER SWITCH NON COATED BLADE ELECTRODE 10 FT (3 M) CORD HOLSTER: Brand: MEGADYNE

## (undated) DEVICE — PUMP SUC IRR TBNG L10FT W/ HNDPC ASSEMB STRYKEFLOW 2

## (undated) DEVICE — DEVICE CLSR 10/12MM XL PRT SYS SUT PASS ST DISP CARTER

## (undated) DEVICE — CHLORAPREP 26ML ORANGE

## (undated) DEVICE — 40583 XL ADVANCED TRENDELENBURG POSITIONING KIT: Brand: 40583 XL ADVANCED TRENDELENBURG POSITIONING KIT

## (undated) DEVICE — SURE SET-DOUBLE BASIN-LF: Brand: MEDLINE INDUSTRIES, INC.